# Patient Record
Sex: FEMALE | Race: WHITE | Employment: FULL TIME | URBAN - METROPOLITAN AREA
[De-identification: names, ages, dates, MRNs, and addresses within clinical notes are randomized per-mention and may not be internally consistent; named-entity substitution may affect disease eponyms.]

---

## 2019-08-22 ENCOUNTER — OFFICE VISIT (OUTPATIENT)
Dept: PODIATRY | Age: 49
End: 2019-08-22
Payer: COMMERCIAL

## 2019-08-22 VITALS — BODY MASS INDEX: 22.08 KG/M2 | HEIGHT: 62 IN | WEIGHT: 120 LBS

## 2019-08-22 DIAGNOSIS — M79.605 PAIN IN BOTH LOWER EXTREMITIES: ICD-10-CM

## 2019-08-22 DIAGNOSIS — M79.604 PAIN IN BOTH LOWER EXTREMITIES: ICD-10-CM

## 2019-08-22 DIAGNOSIS — M20.42 HAMMER TOE OF LEFT FOOT: Primary | ICD-10-CM

## 2019-08-22 PROCEDURE — 99203 OFFICE O/P NEW LOW 30 MIN: CPT | Performed by: PODIATRIST

## 2019-08-22 NOTE — PROGRESS NOTES
changes, confusion, headaches or seizures. Negative for weakness and numbness. Dermatological ROS: negative for - mole changes, rash  Cardiovascular: Negative for leg swelling. Gastrointestinal: Negative for constipation, diarrhea, nausea and vomiting. Lower Extremity Physical Examination:   Vitals: There were no vitals filed for this visit. General: AAO x 3 in NAD. Dermatologic Exam:  Skin lesion/ulceration Absent . Skin No rashes or nodules noted. .       Musculoskeletal:     1st MPJ ROM decreased, Bilateral.  Muscle strength 5/5, Bilateral.  Pain present upon palpation of 4th digit, kirstie. Medial longitudinal arch, Bilateral WNL. Ankle ROM WNL,Bilateral.    Dorsally contracted digits absent digits 4 Bilateral.     Vascular: DP and PT pulses palpable 2/4, Bilateral.  CFT <3 seconds, Bilateral.  Hair growth present to the level of the digits, Bilateral.  Edema absent, Bilateral.  Varicosities absent, Bilateral. Erythema absent, Bilateral    Neurological: Sensation intact to light touch to level of digits, Bilateral.  Protective sensation intact 10/10 sites via 5.07/10g Markle-Niko Monofilament, Bilateral.  negative Tinel's, Bilateral.  negative Valleix sign, Bilateral.      Integument: Warm, dry, supple, Bilateral.  Open lesion absent, Bilateral.  Interdigital maceration absent to web spaces 1-4, Bilateral.  Nails are normal in length, thickness and color 1-5 bilateral.  Fissures absent, Bilateral.       Asessment: Patient is a 44 y.o. female with:    Diagnosis Orders   1. Hammer toe of left foot     2. Pain in both lower extremities           Plan: Patient examined and evaluated. Current condition and treatment options discussed in detail. Discussed conservative and surgical options with the patient. Advised pt to consider surgery as last treatment option to straighten hammertoes. She would like to stick with conservative treatment. Dispensed toe sleeves today.   Verbal and written

## 2019-08-29 DIAGNOSIS — B35.1 DERMATOPHYTOSIS OF NAIL: Primary | ICD-10-CM

## 2023-04-18 ENCOUNTER — APPOINTMENT (OUTPATIENT)
Dept: GENERAL RADIOLOGY | Age: 53
End: 2023-04-18
Payer: COMMERCIAL

## 2023-04-18 ENCOUNTER — HOSPITAL ENCOUNTER (EMERGENCY)
Age: 53
Discharge: HOME | End: 2023-04-18
Attending: EMERGENCY MEDICINE
Payer: COMMERCIAL

## 2023-04-18 VITALS
DIASTOLIC BLOOD PRESSURE: 73 MMHG | OXYGEN SATURATION: 100 % | RESPIRATION RATE: 16 BRPM | TEMPERATURE: 98 F | SYSTOLIC BLOOD PRESSURE: 175 MMHG | HEIGHT: 63 IN | WEIGHT: 125 LBS | BODY MASS INDEX: 22.15 KG/M2 | HEART RATE: 50 BPM

## 2023-04-18 DIAGNOSIS — S52.502A CLOSED FRACTURE OF DISTAL END OF LEFT RADIUS, UNSPECIFIED FRACTURE MORPHOLOGY, INITIAL ENCOUNTER: Primary | ICD-10-CM

## 2023-04-18 PROCEDURE — 73110 X-RAY EXAM OF WRIST: CPT

## 2023-04-18 PROCEDURE — 90715 TDAP VACCINE 7 YRS/> IM: CPT | Performed by: NURSE PRACTITIONER

## 2023-04-18 PROCEDURE — 6360000002 HC RX W HCPCS: Performed by: NURSE PRACTITIONER

## 2023-04-18 PROCEDURE — 29125 APPL SHORT ARM SPLINT STATIC: CPT

## 2023-04-18 PROCEDURE — 73130 X-RAY EXAM OF HAND: CPT

## 2023-04-18 PROCEDURE — 99284 EMERGENCY DEPT VISIT MOD MDM: CPT

## 2023-04-18 PROCEDURE — 6370000000 HC RX 637 (ALT 250 FOR IP): Performed by: NURSE PRACTITIONER

## 2023-04-18 PROCEDURE — 90471 IMMUNIZATION ADMIN: CPT | Performed by: NURSE PRACTITIONER

## 2023-04-18 PROCEDURE — 73090 X-RAY EXAM OF FOREARM: CPT

## 2023-04-18 RX ORDER — HYDROCODONE BITARTRATE AND ACETAMINOPHEN 5; 325 MG/1; MG/1
1 TABLET ORAL ONCE
Status: DISCONTINUED | OUTPATIENT
Start: 2023-04-18 | End: 2023-04-18

## 2023-04-18 RX ORDER — HYDROCODONE BITARTRATE AND ACETAMINOPHEN 5; 325 MG/1; MG/1
1 TABLET ORAL ONCE
Status: COMPLETED | OUTPATIENT
Start: 2023-04-18 | End: 2023-04-18

## 2023-04-18 RX ORDER — HYDROCODONE BITARTRATE AND ACETAMINOPHEN 5; 325 MG/1; MG/1
1 TABLET ORAL EVERY 6 HOURS PRN
Qty: 10 TABLET | Refills: 0 | Status: ON HOLD | OUTPATIENT
Start: 2023-04-18 | End: 2023-04-21 | Stop reason: HOSPADM

## 2023-04-18 RX ORDER — MORPHINE SULFATE 2 MG/ML
4 INJECTION, SOLUTION INTRAMUSCULAR; INTRAVENOUS ONCE
Status: DISCONTINUED | OUTPATIENT
Start: 2023-04-18 | End: 2023-04-18

## 2023-04-18 RX ADMIN — HYDROCODONE BITARTRATE AND ACETAMINOPHEN 1 TABLET: 5; 325 TABLET ORAL at 20:51

## 2023-04-18 RX ADMIN — TETANUS TOXOID, REDUCED DIPHTHERIA TOXOID AND ACELLULAR PERTUSSIS VACCINE, ADSORBED 0.5 ML: 5; 2.5; 8; 8; 2.5 SUSPENSION INTRAMUSCULAR at 20:57

## 2023-04-18 ASSESSMENT — ENCOUNTER SYMPTOMS
EYES NEGATIVE: 1
GASTROINTESTINAL NEGATIVE: 1
RESPIRATORY NEGATIVE: 1

## 2023-04-18 ASSESSMENT — PAIN SCALES - GENERAL
PAINLEVEL_OUTOF10: 7
PAINLEVEL_OUTOF10: 7

## 2023-04-18 ASSESSMENT — PAIN DESCRIPTION - ORIENTATION: ORIENTATION: LEFT

## 2023-04-18 ASSESSMENT — PAIN - FUNCTIONAL ASSESSMENT: PAIN_FUNCTIONAL_ASSESSMENT: 0-10

## 2023-04-18 ASSESSMENT — PAIN DESCRIPTION - LOCATION: LOCATION: WRIST

## 2023-04-19 ENCOUNTER — OFFICE VISIT (OUTPATIENT)
Dept: ORTHOPEDIC SURGERY | Age: 53
End: 2023-04-19
Payer: COMMERCIAL

## 2023-04-19 VITALS — RESPIRATION RATE: 14 BRPM | HEIGHT: 63 IN | WEIGHT: 125 LBS | BODY MASS INDEX: 22.15 KG/M2

## 2023-04-19 DIAGNOSIS — S52.552A OTHER CLOSED EXTRA-ARTICULAR FRACTURE OF DISTAL END OF LEFT RADIUS, INITIAL ENCOUNTER: Primary | ICD-10-CM

## 2023-04-19 PROCEDURE — 99204 OFFICE O/P NEW MOD 45 MIN: CPT | Performed by: ORTHOPAEDIC SURGERY

## 2023-04-19 NOTE — ED PROVIDER NOTES
[facility]aur[facility]  Emergency Department  Faculty Attestation     Pt Name: Jennifer Hernandez  MRN: [id number]  [place] 1970  Date of evaluation: 4/19/23    I was personally available for consultation in the Emergency Department. Have reviewed everything on the chart that is available and agree with the documentation provided by the [facility] AND [facility], including discussion about the assessment, treatment plan and disposition. Jennifer Hernandez is a 45 y.o. female who presents with Wrist Injury (Left wrist, running, ankle gave out and fell and believes she broke her wrist,. +deformity)      Vitals:   Vitals:    04/18/23 1841   BP: (!) 175/73   Pulse: 50   Resp: 16   Temp: 98 °F (36.7 °C)   TempSrc: Oral   SpO2: 100%   Weight: 56.7 kg (125 lb)   Height: 5' 3\" (1.6 m)       Impression:   1.  Closed fracture of distal end of left radius, unspecified fracture morphology, initial encounter           Santiago Garcia MD  04/19/23 8099
when to return to the emergency department such as any signs of cyanosis of the finger, significant increase in swelling, significant increase in pain, further injury, or any other new concerning or worsening symptoms. Patient states understanding of education is stable for outpatient follow-up at this time. PLAN (LABS / IMAGING / EKG):  Orders Placed This Encounter   Procedures    XR WRIST LEFT (MIN 3 VIEWS)    XR RADIUS ULNA LEFT (2 VIEWS)    XR HAND LEFT (MIN 3 VIEWS)    [facility] Arm Sling, Left; M       MEDICATIONS ORDERED:  Orders Placed This Encounter   Medications    DISCONTD: HYDROcodone-acetaminophen (NORCO) 5-325 MG per tablet 1 tablet    Tetanus-Diphth-Acell Pertussis (BOOSTRIX) injection 0.5 mL    DISCONTD: morphine (PF) injection 4 mg    HYDROcodone-acetaminophen (NORCO) 5-325 MG per tablet 1 tablet       Controlled Substances Monitoring:     DIAGNOSTIC RESULTS     EKG: All EKG's are interpreted by the Emergency Department Physician who either signs or Co-signs this chart in the absenceof a cardiologist.      RADIOLOGY: All images are read by the radiologist and their interpretations are reviewed. XR WRIST LEFT (MIN 3 VIEWS)   Final Result   1. Acute mildly angulated displaced impaction fracture of the distal left   radial metaphysis. 2. No acute osseous abnormality of the left wrist.   3. No acute osseous abnormality of the left hand. XR RADIUS ULNA LEFT (2 VIEWS)   Final Result   1. Acute mildly angulated displaced impaction fracture of the distal left   radial metaphysis. 2. No acute osseous abnormality of the left wrist.   3. No acute osseous abnormality of the left hand. XR HAND LEFT (MIN 3 VIEWS)   Final Result   1. Acute mildly angulated displaced impaction fracture of the distal left   radial metaphysis. 2. No acute osseous abnormality of the left wrist.   3. No acute osseous abnormality of the left hand.              No results

## 2023-04-19 NOTE — PROGRESS NOTES
Preoperative Instructions:    Stop eating solid foods at midnight the night prior to your surgery. Stop drinking clear liquids at midnight the night prior to your surgery. Arrive at the surgery center (3rd entrance) on __0-61-52_____________ by ____0800am___________. Please stop any blood thinning medications as directed by your surgeon or prescribing physician. Failure to stop certain medications may interfere with your scheduled surgery. These may include: Aspirin, Coumadin, Plavix, NSAIDS (Motrin, Aleve, Advil, Mobic, Celebrex), Eliquis, Pradaxa, Xarelto, Fish oil, and herbal supplements. You may continue the rest of your medications through the night before surgery unless instructed otherwise. Day of surgery please take only the following medication(s) with a small sip of water: Norco if needed for severe pain. Please  shower with antibacterial soap and water (DO NOT REMOVE SPLINT IF instructed not  do so). the day of surgery. Reminders:  -If you are going home the day of your procedure, you will need a family member or friend to stay during the procedure and drive you home after your procedure. Your  must be 25years of age or older and able to sign off on your discharge instructions.    -If you are going home the same day of your surgery, someone must remain with you for the first 24 hours after your surgery if you receive sedation or anesthesia.      -Please do not wear any jewelery, lotions, contacts  or body piercing the day of surgery

## 2023-04-20 ENCOUNTER — ANESTHESIA EVENT (OUTPATIENT)
Dept: OPERATING ROOM | Age: 53
End: 2023-04-20
Payer: COMMERCIAL

## 2023-04-20 RX ORDER — SODIUM CHLORIDE 0.9 % (FLUSH) 0.9 %
5-40 SYRINGE (ML) INJECTION EVERY 12 HOURS SCHEDULED
Status: CANCELLED | OUTPATIENT
Start: 2023-04-20

## 2023-04-20 RX ORDER — ACETAMINOPHEN 325 MG/1
1000 TABLET ORAL ONCE
Status: CANCELLED | OUTPATIENT
Start: 2023-04-20 | End: 2023-04-20

## 2023-04-20 RX ORDER — SODIUM CHLORIDE 0.9 % (FLUSH) 0.9 %
5-40 SYRINGE (ML) INJECTION PRN
Status: CANCELLED | OUTPATIENT
Start: 2023-04-20

## 2023-04-20 RX ORDER — SODIUM CHLORIDE 9 MG/ML
INJECTION, SOLUTION INTRAVENOUS PRN
Status: CANCELLED | OUTPATIENT
Start: 2023-04-20

## 2023-04-21 ENCOUNTER — ANESTHESIA (OUTPATIENT)
Dept: OPERATING ROOM | Age: 53
End: 2023-04-21
Payer: COMMERCIAL

## 2023-04-21 ENCOUNTER — APPOINTMENT (OUTPATIENT)
Dept: GENERAL RADIOLOGY | Age: 53
End: 2023-04-21
Attending: ORTHOPAEDIC SURGERY
Payer: COMMERCIAL

## 2023-04-21 ENCOUNTER — HOSPITAL ENCOUNTER (OUTPATIENT)
Age: 53
Setting detail: OUTPATIENT SURGERY
Discharge: HOME | End: 2023-04-21
Attending: ORTHOPAEDIC SURGERY | Admitting: ORTHOPAEDIC SURGERY
Payer: COMMERCIAL

## 2023-04-21 VITALS
HEIGHT: 63 IN | OXYGEN SATURATION: 98 % | HEART RATE: 49 BPM | SYSTOLIC BLOOD PRESSURE: 133 MMHG | BODY MASS INDEX: 22.71 KG/M2 | RESPIRATION RATE: 16 BRPM | TEMPERATURE: 97.3 F | WEIGHT: 128.2 LBS | DIASTOLIC BLOOD PRESSURE: 71 MMHG

## 2023-04-21 DIAGNOSIS — S52.552A OTHER CLOSED EXTRA-ARTICULAR FRACTURE OF DISTAL END OF LEFT RADIUS, INITIAL ENCOUNTER: Primary | ICD-10-CM

## 2023-04-21 PROCEDURE — 7100000010 HC PHASE II RECOVERY - FIRST 15 MIN: Performed by: ORTHOPAEDIC SURGERY

## 2023-04-21 PROCEDURE — 25605 CLTX DST RDL FX/EPHYS SEP W/: CPT | Performed by: ORTHOPAEDIC SURGERY

## 2023-04-21 PROCEDURE — 7100000011 HC PHASE II RECOVERY - ADDTL 15 MIN: Performed by: ORTHOPAEDIC SURGERY

## 2023-04-21 PROCEDURE — 3600000002 HC SURGERY LEVEL 2 BASE: Performed by: ORTHOPAEDIC SURGERY

## 2023-04-21 PROCEDURE — 6370000000 HC RX 637 (ALT 250 FOR IP)

## 2023-04-21 PROCEDURE — 3700000001 HC ADD 15 MINUTES (ANESTHESIA): Performed by: ORTHOPAEDIC SURGERY

## 2023-04-21 PROCEDURE — 2500000003 HC RX 250 WO HCPCS

## 2023-04-21 PROCEDURE — 7100000000 HC PACU RECOVERY - FIRST 15 MIN: Performed by: ORTHOPAEDIC SURGERY

## 2023-04-21 PROCEDURE — 3209999900 FLUORO FOR SURGICAL PROCEDURES

## 2023-04-21 PROCEDURE — 2580000003 HC RX 258: Performed by: ANESTHESIOLOGY

## 2023-04-21 PROCEDURE — 3700000000 HC ANESTHESIA ATTENDED CARE: Performed by: ORTHOPAEDIC SURGERY

## 2023-04-21 PROCEDURE — 2580000003 HC RX 258: Performed by: ORTHOPAEDIC SURGERY

## 2023-04-21 PROCEDURE — 7100000001 HC PACU RECOVERY - ADDTL 15 MIN: Performed by: ORTHOPAEDIC SURGERY

## 2023-04-21 PROCEDURE — 2709999900 HC NON-CHARGEABLE SUPPLY: Performed by: ORTHOPAEDIC SURGERY

## 2023-04-21 PROCEDURE — 3600000012 HC SURGERY LEVEL 2 ADDTL 15MIN: Performed by: ORTHOPAEDIC SURGERY

## 2023-04-21 PROCEDURE — 6360000002 HC RX W HCPCS: Performed by: ORTHOPAEDIC SURGERY

## 2023-04-21 PROCEDURE — 6360000002 HC RX W HCPCS

## 2023-04-21 RX ORDER — DEXAMETHASONE SODIUM PHOSPHATE 10 MG/ML
INJECTION, SOLUTION INTRAMUSCULAR; INTRAVENOUS PRN
Status: DISCONTINUED | OUTPATIENT
Start: 2023-04-21 | End: 2023-04-21 | Stop reason: SDUPTHER

## 2023-04-21 RX ORDER — LIDOCAINE HYDROCHLORIDE 10 MG/ML
1 INJECTION, SOLUTION INFILTRATION; PERINEURAL
Status: DISCONTINUED | OUTPATIENT
Start: 2023-04-21 | End: 2023-04-21 | Stop reason: HOSPADM

## 2023-04-21 RX ORDER — PROPOFOL 10 MG/ML
INJECTION, EMULSION INTRAVENOUS PRN
Status: DISCONTINUED | OUTPATIENT
Start: 2023-04-21 | End: 2023-04-21 | Stop reason: SDUPTHER

## 2023-04-21 RX ORDER — OXYCODONE HYDROCHLORIDE 5 MG/1
5 TABLET ORAL PRN
Status: DISCONTINUED | OUTPATIENT
Start: 2023-04-21 | End: 2023-04-21 | Stop reason: HOSPADM

## 2023-04-21 RX ORDER — SODIUM CHLORIDE, SODIUM LACTATE, POTASSIUM CHLORIDE, CALCIUM CHLORIDE 600; 310; 30; 20 MG/100ML; MG/100ML; MG/100ML; MG/100ML
INJECTION, SOLUTION INTRAVENOUS CONTINUOUS
Status: DISCONTINUED | OUTPATIENT
Start: 2023-04-21 | End: 2023-04-21 | Stop reason: HOSPADM

## 2023-04-21 RX ORDER — DEXAMETHASONE SODIUM PHOSPHATE 10 MG/ML
10 INJECTION, SOLUTION INTRAMUSCULAR; INTRAVENOUS ONCE
Status: DISCONTINUED | OUTPATIENT
Start: 2023-04-21 | End: 2023-04-21 | Stop reason: HOSPADM

## 2023-04-21 RX ORDER — CEFAZOLIN 2 G/1
INJECTION, POWDER, FOR SOLUTION INTRAMUSCULAR; INTRAVENOUS
Status: DISCONTINUED
Start: 2023-04-21 | End: 2023-04-21 | Stop reason: HOSPADM

## 2023-04-21 RX ORDER — SODIUM CHLORIDE 9 MG/ML
INJECTION, SOLUTION INTRAVENOUS PRN
Status: DISCONTINUED | OUTPATIENT
Start: 2023-04-21 | End: 2023-04-21 | Stop reason: HOSPADM

## 2023-04-21 RX ORDER — ONDANSETRON 2 MG/ML
4 INJECTION INTRAMUSCULAR; INTRAVENOUS
Status: DISCONTINUED | OUTPATIENT
Start: 2023-04-21 | End: 2023-04-21 | Stop reason: HOSPADM

## 2023-04-21 RX ORDER — SODIUM CHLORIDE 0.9 % (FLUSH) 0.9 %
5-40 SYRINGE (ML) INJECTION EVERY 12 HOURS SCHEDULED
Status: DISCONTINUED | OUTPATIENT
Start: 2023-04-21 | End: 2023-04-21 | Stop reason: HOSPADM

## 2023-04-21 RX ORDER — ACETAMINOPHEN 500 MG
1000 TABLET ORAL ONCE
Status: COMPLETED | OUTPATIENT
Start: 2023-04-21 | End: 2023-04-21

## 2023-04-21 RX ORDER — MEPERIDINE HYDROCHLORIDE 50 MG/ML
12.5 INJECTION INTRAMUSCULAR; INTRAVENOUS; SUBCUTANEOUS ONCE
Status: DISCONTINUED | OUTPATIENT
Start: 2023-04-21 | End: 2023-04-21 | Stop reason: HOSPADM

## 2023-04-21 RX ORDER — ACETAMINOPHEN 500 MG
TABLET ORAL
Status: COMPLETED
Start: 2023-04-21 | End: 2023-04-21

## 2023-04-21 RX ORDER — HYDRALAZINE HYDROCHLORIDE 20 MG/ML
10 INJECTION INTRAMUSCULAR; INTRAVENOUS
Status: DISCONTINUED | OUTPATIENT
Start: 2023-04-21 | End: 2023-04-21 | Stop reason: HOSPADM

## 2023-04-21 RX ORDER — SODIUM CHLORIDE 0.9 % (FLUSH) 0.9 %
5-40 SYRINGE (ML) INJECTION PRN
Status: DISCONTINUED | OUTPATIENT
Start: 2023-04-21 | End: 2023-04-21 | Stop reason: HOSPADM

## 2023-04-21 RX ORDER — METOCLOPRAMIDE HYDROCHLORIDE 5 MG/ML
10 INJECTION INTRAMUSCULAR; INTRAVENOUS
Status: DISCONTINUED | OUTPATIENT
Start: 2023-04-21 | End: 2023-04-21 | Stop reason: HOSPADM

## 2023-04-21 RX ORDER — DIPHENHYDRAMINE HYDROCHLORIDE 50 MG/ML
12.5 INJECTION INTRAMUSCULAR; INTRAVENOUS
Status: DISCONTINUED | OUTPATIENT
Start: 2023-04-21 | End: 2023-04-21 | Stop reason: HOSPADM

## 2023-04-21 RX ORDER — LIDOCAINE HYDROCHLORIDE 10 MG/ML
INJECTION, SOLUTION INFILTRATION; PERINEURAL PRN
Status: DISCONTINUED | OUTPATIENT
Start: 2023-04-21 | End: 2023-04-21 | Stop reason: SDUPTHER

## 2023-04-21 RX ORDER — MORPHINE SULFATE 2 MG/ML
1 INJECTION, SOLUTION INTRAMUSCULAR; INTRAVENOUS EVERY 5 MIN PRN
Status: DISCONTINUED | OUTPATIENT
Start: 2023-04-21 | End: 2023-04-21 | Stop reason: HOSPADM

## 2023-04-21 RX ORDER — HYDROCODONE BITARTRATE AND ACETAMINOPHEN 5; 325 MG/1; MG/1
1 TABLET ORAL EVERY 4 HOURS PRN
Qty: 20 TABLET | Refills: 0 | Status: SHIPPED | OUTPATIENT
Start: 2023-04-21 | End: 2023-04-26

## 2023-04-21 RX ORDER — DEXAMETHASONE SODIUM PHOSPHATE 10 MG/ML
INJECTION, SOLUTION INTRAMUSCULAR; INTRAVENOUS
Status: COMPLETED
Start: 2023-04-21 | End: 2023-04-21

## 2023-04-21 RX ORDER — FENTANYL CITRATE 50 UG/ML
INJECTION, SOLUTION INTRAMUSCULAR; INTRAVENOUS PRN
Status: DISCONTINUED | OUTPATIENT
Start: 2023-04-21 | End: 2023-04-21 | Stop reason: SDUPTHER

## 2023-04-21 RX ORDER — OXYCODONE HYDROCHLORIDE 5 MG/1
10 TABLET ORAL PRN
Status: DISCONTINUED | OUTPATIENT
Start: 2023-04-21 | End: 2023-04-21 | Stop reason: HOSPADM

## 2023-04-21 RX ORDER — SODIUM CHLORIDE 9 MG/ML
25 INJECTION, SOLUTION INTRAVENOUS PRN
Status: DISCONTINUED | OUTPATIENT
Start: 2023-04-21 | End: 2023-04-21 | Stop reason: HOSPADM

## 2023-04-21 RX ORDER — MIDAZOLAM HYDROCHLORIDE 1 MG/ML
INJECTION INTRAMUSCULAR; INTRAVENOUS PRN
Status: DISCONTINUED | OUTPATIENT
Start: 2023-04-21 | End: 2023-04-21 | Stop reason: SDUPTHER

## 2023-04-21 RX ORDER — ONDANSETRON 2 MG/ML
INJECTION INTRAMUSCULAR; INTRAVENOUS PRN
Status: DISCONTINUED | OUTPATIENT
Start: 2023-04-21 | End: 2023-04-21 | Stop reason: SDUPTHER

## 2023-04-21 RX ADMIN — Medication 1000 MG: at 08:39

## 2023-04-21 RX ADMIN — SODIUM CHLORIDE, POTASSIUM CHLORIDE, SODIUM LACTATE AND CALCIUM CHLORIDE: 600; 310; 30; 20 INJECTION, SOLUTION INTRAVENOUS at 09:03

## 2023-04-21 RX ADMIN — LIDOCAINE HYDROCHLORIDE 40 MG: 10 INJECTION, SOLUTION INFILTRATION; PERINEURAL at 09:58

## 2023-04-21 RX ADMIN — PROPOFOL 150 MG: 10 INJECTION, EMULSION INTRAVENOUS at 09:58

## 2023-04-21 RX ADMIN — MIDAZOLAM 2 MG: 1 INJECTION INTRAMUSCULAR; INTRAVENOUS at 09:54

## 2023-04-21 RX ADMIN — ACETAMINOPHEN 1000 MG: 500 TABLET ORAL at 08:39

## 2023-04-21 RX ADMIN — DEXAMETHASONE SODIUM PHOSPHATE 10 MG: 10 INJECTION, SOLUTION INTRAMUSCULAR; INTRAVENOUS at 10:08

## 2023-04-21 RX ADMIN — ONDANSETRON 4 MG: 2 INJECTION INTRAMUSCULAR; INTRAVENOUS at 10:13

## 2023-04-21 RX ADMIN — FENTANYL CITRATE 50 MCG: 50 INJECTION, SOLUTION INTRAMUSCULAR; INTRAVENOUS at 09:58

## 2023-04-21 RX ADMIN — CEFAZOLIN 2000 MG: 2 INJECTION, POWDER, FOR SOLUTION INTRAMUSCULAR; INTRAVENOUS at 10:08

## 2023-04-21 ASSESSMENT — PAIN - FUNCTIONAL ASSESSMENT: PAIN_FUNCTIONAL_ASSESSMENT: 0-10

## 2023-04-21 ASSESSMENT — PAIN DESCRIPTION - DESCRIPTORS: DESCRIPTORS: ACHING

## 2023-04-21 NOTE — BRIEF OP NOTE
Brief Postoperative Note      Patient: Claude Seaman  YOB: 1970  MRN: [id number]    Date of Procedure: 4/21/2023    Pre-Op Diagnosis Codes:     * Closed fracture of distal end of left radius, unspecified fracture morphology, initial encounter [S52.502A]    Post-Op Diagnosis: Same       Procedure(s):  LEFT DISTAL RADIUS CLOSED REDUCTION AND IMMOBILIZATION    Surgeon(s):  Debo Brown MD    Assistant:  Resident: Keyonna Ugaret DO    Anesthesia: General    Estimated Blood Loss (mL): None    Complications: None    Specimens:   * No specimens in log *    Implants:  * No implants in log *      Drains: * No LDAs found *    Findings: See procedure report      Electronically signed by Debo Brown MD on 4/21/2023 at 10:17 AM

## 2023-04-21 NOTE — PROGRESS NOTES
CLINICAL PHARMACY NOTE: MEDS TO BEDS    Total # of Prescriptions Filled: 1   The following medications were delivered to the patient:  Larence Model 5-325    Additional Documentation:

## 2023-04-21 NOTE — DISCHARGE INSTRUCTIONS
after that for pain and swelling. EXERCISE  You may begin moving your fingers which will help decrease swelling    EMERGENCIES  Contact Dr. Juma Fairchild or his nurse at [phone] if any of the following are present: Painful swelling or numbness, Unrelenting pain, Fever (over 101 - it is normal to have a low grade fever for the first day or two following surgery) or chills, Redness around incisions, Color change in hand or fingers, Difficulty breathing, Excessive nausea/vomiting **If you have an emergency after office hours or on the weekend, contact the same office number ([phone]) and you will be connected to our page service - they will contact Dr. Juma Fairchild or his partner if he is unavailable. **If you have an emergency that requires immediate attention, proceed to the nearest emergency room. FOLLOW-UP CARE / QUESTIONS  If you have any questions/concerns, please call the office [phone]. You should be scheduled to follow up with Dr. Juma Fairchild in 2 weeks. Contact the office to confirm/schedule your post-procedure visit. Activity  You have had anesthesia today  Do not drive, operate heavy equipment, consume alcoholic beverages, or make any important decisions  for 24 hours   If you are taking pain medication: Do not drive or consume alcohol. Take your time changing positions today. You may feel light headed or dizzy if you move too quickly. Continue your home medications as ordered by your physician. Diet   You can eat your normal diet when you feel well. You should start off with bland foods like chicken soup, toast, or yogurt. Then advance as tolerated. Drink plenty of fluids (unless your doctor tells you not to). Your urine should be very lightly colored without a strong odor.

## 2023-04-21 NOTE — H&P
PHYSICAL EXAM:  Blood pressure 126/76, pulse 53, temperature 97.3 °F (36.3 °C), temperature source Oral, resp. rate 13, height 5' 3\" (1.6 m), weight 128 lb 3.2 oz (58.2 kg), SpO2 97 %. Gen: alert and oriented, NAD, cooperative  Head: normocephalic atraumatic   Cardiovascular: Regular rate, no dependent edema, distal pulses 2+  Respiratory: Chest symmetric, no accessory muscle use, normal respirations  MSK:   LUE   Splint on, clean/dry/intact. Fingers swollen but compressible. No pain with passive flexion/extension of fingers. Sensation and motor intact grossly. Fingers warm and well perfused with bcr. A/P: 45 y.o. female  was evaluated and after discussion surgical and non surgical options, the patient has decided to undergo closed reduction of left distal radius  Consent obtained and in chart. All questions answered appropriately. Surgical risks including but not limited to: bleeding, blood clots, infection, damage to surrounding tissues/nerves/vessels, failure of fixation, failure of wounds to heal, loss of motion, stiffness, dislocation, postoperative pain, recurrence of symptoms, need for future surgery, risks of anesthesia, loss of limb and loss of life were all discussed with the patient. Knowing these risks, the patient wishes to proceed with surgery. -Abx OCTOR  -Site marked, Consent in chart  -AC held  -NPO since midnight  -All question answered. ________________________________  Niharika Waller D.O.   Orthopedic Surgery Resident, PGY-1  [address] [facility]

## 2023-04-21 NOTE — ANESTHESIA POSTPROCEDURE EVALUATION
POST- ANESTHESIA EVALUATION       Pt Name: Jonah Romero  MRN: [id number]  [place]: 1970  Date of evaluation: 4/21/2023  Time:  11:57 AM      /71   Pulse (!) 49   Temp 97.3 °F (36.3 °C) (Temporal)   Resp 16   Ht 5' 3\" (1.6 m)   Wt 128 lb 3.2 oz (58.2 kg)   LMP  (Approximate) Comment: last period iin 2017  SpO2 98%   BMI 22.71 kg/m²      Consciousness Level  Awake  Cardiopulmonary Status  Stable  Pain Adequately Treated YES  Nausea / Vomiting  NO  Adequate Hydration  YES  Anesthesia Related Complications NONE      Electronically signed by Daphne Martin MD on 4/21/2023 at 11:57 AM       Department of Anesthesiology  Postprocedure Note    Patient: Jonah Romero  MRN: [id number]  [place]: 1970  Date of evaluation: 4/21/2023      Procedure Summary     Date: 04/21/23 Room / Location: [address]) [facility]    Anesthesia Start: 7958 Anesthesia Stop: 1033    Procedure: LEFT DISTAL RADIUS CLOSED REDUCTION AND IMMOBILIZATION Diagnosis:       Closed fracture of distal end of left radius, unspecified fracture morphology, initial encounter      (Closed fracture of distal end of left radius, unspecified fracture morphology, initial encounter [S52.502A])    Surgeons: Rimma Mccarty MD Responsible Provider: Daphne Martin MD    Anesthesia Type: general ASA Status: 1          Anesthesia Type: No value filed.     Alejandra Phase I: Alejandra Score: 10    Alejandra Phase II: Alejandra Score: 10      Anesthesia Post Evaluation

## 2023-04-21 NOTE — ANESTHESIA PRE PROCEDURE
Applicable):  No results found for: LABABO, LABRH    Drug/Infectious Status (If Applicable):  No results found for: HIV, HEPCAB    COVID-19 Screening (If Applicable): No results found for: COVID19        Anesthesia Evaluation  Patient summary reviewed and Nursing notes reviewed no history of anesthetic complications:   Airway: Mallampati: I  TM distance: >3 FB   Neck ROM: full  Mouth opening: > = 3 FB   Dental: normal exam         Pulmonary:Negative Pulmonary ROS and normal exam  breath sounds clear to auscultation                             Cardiovascular:Negative CV ROS            Rhythm: regular  Rate: normal                    Neuro/Psych:   Negative Neuro/Psych ROS              GI/Hepatic/Renal: Neg GI/Hepatic/Renal ROS            Endo/Other: Negative Endo/Other ROS                     ROS comment: Closed fracture of distal end of left radius Abdominal:       Abdomen: soft. Vascular: negative vascular ROS. Other Findings:           Anesthesia Plan      general     ASA 1       Induction: intravenous. Anesthetic plan and risks discussed with patient. Plan discussed with CRNA.                     Neli Truong MD   4/21/2023

## 2023-04-21 NOTE — PROGRESS NOTES
ORTHOPEDIC PATIENT EVALUATION      HPI / Chief Complaint  Jil Aparicio is a 45 y.o. female who presents for evaluation of her left wrist.  On 2023 she indicates that she went out for a run and her ankle gave out on her and she fell landing on and hurting her left wrist.  She was seen in the emergency department where she was diagnosed with a distal radius fracture. She was placed in a sling and sugar-tong splint and referred to my clinic for further evaluation and treatment. Her pain at this time is localized to the wrist.  She denies having any associated numbness or tingling. Past Medical History  Doris Jani  has no past medical history on file. Past Surgical History  Doris Jain  has a past surgical history that includes  section and Tonsillectomy. Current Medications  Current Outpatient Medications   Medication Sig Dispense Refill    Calcium Carbonate (CALCIUM 500 PO) Take by mouth daily      HYDROcodone-acetaminophen (NORCO) 5-325 MG per tablet Take 1 tablet by mouth every 6 hours as needed for Pain for up to 3 days. Intended supply: 3 days. Take lowest dose possible to manage pain Max Daily Amount: 4 tablets 10 tablet 0     No current facility-administered medications for this visit. Allergies  Allergies have been reviewed. Susan has No Known Allergies. Social History  Susan  reports that she has never smoked. She has never used smokeless tobacco. She reports that she does not currently use alcohol. She reports that she does not use drugs. Family History  Susan's family history is not on file. Review of Systems   History obtained from the patient.    REVIEW OF SYSTEMS:   Constitution: negative for fever, chills, weight loss or malaise   Musculoskeletal: As noted in the HPI   Neurologic: As noted in the HPI    Physical Exam  Resp 14   Ht 5' 3\" (1.6 m)   Wt 125 lb (56.7 kg)   BMI 22.14 kg/m²    General Appearance: alert, well appearing, and in no distress  Mental Status:

## 2023-04-24 NOTE — OP NOTE
Procedure Report     Date of Procedure: 4/21/23     Pre-procedure Diagnosis: Closed left distal radius fracture (S52.502A)     Post-procedure Diagnosis: Closed left distal radius fracture (B85.011X)     Procedure: Closed reduction and immobilization of left distal radius fracture (76417)     Surgeon(s): Sharon Ghosh MD    Assistant: Nicolasa Carrion DO (PGY 1)     Anesthesia: General     Estimated blood loss: None     Findings: Angulated distal radius fracture     Procedure Indications: Ms. Zoila Schumacher is a 45 y.o. old female who presented with a closed left distal radius fracture. Following a discussion with the patient regarding both non-operative and operative treatment options, she consented to proceed with closed reduction and immobilization of her left distal radius fracture. she came to this decision after demonstrating an understanding of our discussion regarding details of the procedure, risks and benefits, expected outcome, and postoperative course. Procedure technique: Following appropriate identification of the patient and her affected extremity, consent was reviewed with the patient and her affected extremity was signed. She was wheeled to the OR where she was sedated by the anesthesia service and her airway was secured. I then proceeded to gently manipulate the patient's left distal radius under fluoroscopic guidance reducing the distal radius to acceptable alignment. A well-padded sugar tong splint was then applied to the patient's left wrist and upper extremity. A 3-point mold was applied while the plastic cured. Maintained alignment of the distal radius was confirmed using fluoroscopy. her left upper extremity was then placed in a sling. She was transferred to her bed and wheeled to recovery in stable condition.       Complications: None     Post-procedure Condition: Stable

## 2023-05-05 ENCOUNTER — OFFICE VISIT (OUTPATIENT)
Dept: ORTHOPEDIC SURGERY | Age: 53
End: 2023-05-05

## 2023-05-05 VITALS — RESPIRATION RATE: 14 BRPM | HEIGHT: 63 IN | BODY MASS INDEX: 22.68 KG/M2 | WEIGHT: 128 LBS

## 2023-05-05 DIAGNOSIS — S52.552A OTHER CLOSED EXTRA-ARTICULAR FRACTURE OF DISTAL END OF LEFT RADIUS, INITIAL ENCOUNTER: Primary | ICD-10-CM

## 2023-05-08 ENCOUNTER — TELEPHONE (OUTPATIENT)
Dept: ORTHOPEDIC SURGERY | Age: 53
End: 2023-05-08

## 2023-05-08 NOTE — TELEPHONE ENCOUNTER
Patient with Left distal Radius Fx was seen on Friday, 5/5 and feels like her cast may be too long for her arm. She is asking for a return call as soon as possible to discuss. Thank you.

## 2023-05-08 NOTE — TELEPHONE ENCOUNTER
Per  rashaun patient can come into the office to get a new cast put on. Patient stated she will come to the [place] office to have new one placed.

## 2023-05-24 ENCOUNTER — OFFICE VISIT (OUTPATIENT)
Dept: ORTHOPEDIC SURGERY | Age: 53
End: 2023-05-24

## 2023-05-24 VITALS — BODY MASS INDEX: 22.68 KG/M2 | HEIGHT: 63 IN | RESPIRATION RATE: 14 BRPM | WEIGHT: 128 LBS

## 2023-05-24 DIAGNOSIS — S52.552A OTHER CLOSED EXTRA-ARTICULAR FRACTURE OF DISTAL END OF LEFT RADIUS, INITIAL ENCOUNTER: Primary | ICD-10-CM

## 2023-05-24 PROCEDURE — 99024 POSTOP FOLLOW-UP VISIT: CPT | Performed by: ORTHOPAEDIC SURGERY

## 2023-05-29 NOTE — PROGRESS NOTES
Procedure: Left distal radius fracture closed reduction and immobilization  Date of procedure: 4/21/2023    HPI: Ms. Isaiah Pike is a 75-year-old approximately one month status post the aforementioned procedure. She indicates that she is doing fairly well at this time having minimal pain. Physical examination:  Evaluation patient's left wrist and upper extremity demonstrates intact skin without warmth or erythema but she has some mild swelling and ecchymosis present. She has a 2+ radial pulse with brisk cap refill in her fingers. She can actively flex, extend, abduct and adduct all of her fingers. Imaging studies:  3 x-ray views of the left wrist completed on 5/24/2023 reviewed independently demonstrating a distal radius fracture in acceptable alignment without any evidence of dislocation or subluxation. She does have some interval callus formation noted across the fracture site. Impression and plan: Ms. Isaiah Pike is a 75-year-old approximately 4 weeks out from a closed left distal radius fracture status post closed reduction and immobilization. She is doing reasonably well at this time. She has her daughters wedding coming up this weekend and so as previously discussed she presents today to have her fracture brace taken off which it was. She was placed in a removable wrist brace. She is to keep this on is much as possible. She states that she intends to wear it during the wedding but will take it off only for pictures. In 2 weeks time she is to begin therapy. A prescription was provided to facilitate this. At that point once her pain has subsided completely she can gradually wean out of the brace that she feels comfortable. I will see her back for reevaluation in 2 months but she was encouraged to return or call earlier with questions and or concerns.

## 2023-06-18 SDOH — HEALTH STABILITY: PHYSICAL HEALTH: ON AVERAGE, HOW MANY DAYS PER WEEK DO YOU ENGAGE IN MODERATE TO STRENUOUS EXERCISE (LIKE A BRISK WALK)?: 4 DAYS

## 2023-06-18 SDOH — HEALTH STABILITY: PHYSICAL HEALTH: ON AVERAGE, HOW MANY MINUTES DO YOU ENGAGE IN EXERCISE AT THIS LEVEL?: 50 MIN

## 2023-06-21 ENCOUNTER — HOSPITAL ENCOUNTER (OUTPATIENT)
Dept: OCCUPATIONAL THERAPY | Facility: CLINIC | Age: 53
Setting detail: THERAPIES SERIES
Discharge: HOME | End: 2023-06-21
Attending: ORTHOPAEDIC SURGERY
Payer: COMMERCIAL

## 2023-06-21 PROCEDURE — 97140 MANUAL THERAPY 1/> REGIONS: CPT

## 2023-06-21 PROCEDURE — 97110 THERAPEUTIC EXERCISES: CPT

## 2023-06-21 NOTE — FLOWSHEET NOTE
[] Jayesh Mark       Occupational Therapy            1st floor       [address], [place]         Phone: [phone]       Fax: [phone] [x] [facility]  [address], [place]  Phone: [phone]  Fax: [phone]     Occupational Therapy Daily Treatment Note    Date:  2023  Patient Name:  Elise Michele    :  1970  MRN: [id number]  Referring Provider:  Gael Mckenna MD  Insurance: [facility] -  visits remaining, combined PT/OT/SP    HARD MAX  Medical Diagnosis: S52.552A (ICD-10-CM) - Other closed extra-articular fracture of distal end of left radius, initial encounter         Rehab Codes: pain in wrist M25.53,, muscle wasting of forearm M62.53,, or muscle wasting of hand M62.54,   Onset Date: 23                 Next Dr. Carbajalls: 23  Visit# / total visits: ; Progress note for Medicare patient due at visit 9    Cancels/No Shows: 0/0      Subjective:    Pain:  [] Yes  [x] No Location:  N/A Pain Rating: (0-10 scale) 0/10  Pain altered Tx:  [x] No  [] Yes  Action:  Pt Comments: It is feeling good. Pt reports having soreness in L wrist after completing HEP. Objective:  Modalities:  Precautions:  Exercises:  Exercise Reps/Time Weight/Level Comments Completed   Wrist AROM 3x10 AROM Flexion/extension  Radial/ulnar deviation  X   Wrist extension 3x10 yellow     Wrist flexion 3x10 yellow     Wrist stretching [id number]   Flexion/extension     putty 2-3 mins yellow gripping     Power web 3x10 Green   X   Gripping 3x10 35lbs  X   Pronation/supination 3x10 2lbs  X   Flexbar  Wrist Flexion/Ext  Wrist pronation  Wrist supination  Wrist ulnar deviation  Wrist radial dev 3x10 Red  X   Pronated wrist curl 3x10 500g Small green ball X   Wrist roll 5 1lbs  X   Other: Access Code: [email or web address]  URL: [email or web address]  Date: 2023  Prepared by: Marlin Apgar      Treatment Charges: Mins Units   []  Modalities:

## 2023-06-26 ENCOUNTER — HOSPITAL ENCOUNTER (OUTPATIENT)
Dept: OCCUPATIONAL THERAPY | Facility: CLINIC | Age: 53
Setting detail: THERAPIES SERIES
Discharge: HOME | End: 2023-06-26
Attending: ORTHOPAEDIC SURGERY
Payer: COMMERCIAL

## 2023-06-30 DIAGNOSIS — M25.571 RIGHT ANKLE PAIN, UNSPECIFIED CHRONICITY: Primary | ICD-10-CM

## 2023-07-02 SDOH — HEALTH STABILITY: PHYSICAL HEALTH: ON AVERAGE, HOW MANY MINUTES DO YOU ENGAGE IN EXERCISE AT THIS LEVEL?: 50 MIN

## 2023-07-02 SDOH — HEALTH STABILITY: PHYSICAL HEALTH: ON AVERAGE, HOW MANY DAYS PER WEEK DO YOU ENGAGE IN MODERATE TO STRENUOUS EXERCISE (LIKE A BRISK WALK)?: 4 DAYS

## 2023-07-02 ASSESSMENT — SOCIAL DETERMINANTS OF HEALTH (SDOH)
WITHIN THE LAST YEAR, HAVE TO BEEN RAPED OR FORCED TO HAVE ANY KIND OF SEXUAL ACTIVITY BY YOUR PARTNER OR EX-PARTNER?: NO
WITHIN THE LAST YEAR, HAVE YOU BEEN HUMILIATED OR EMOTIONALLY ABUSED IN OTHER WAYS BY YOUR PARTNER OR EX-PARTNER?: NO
WITHIN THE LAST YEAR, HAVE YOU BEEN KICKED, HIT, SLAPPED, OR OTHERWISE PHYSICALLY HURT BY YOUR PARTNER OR EX-PARTNER?: NO
WITHIN THE LAST YEAR, HAVE YOU BEEN AFRAID OF YOUR PARTNER OR EX-PARTNER?: NO

## 2023-07-03 ENCOUNTER — OFFICE VISIT (OUTPATIENT)
Dept: ORTHOPEDIC SURGERY | Age: 53
End: 2023-07-03
Payer: COMMERCIAL

## 2023-07-03 VITALS — BODY MASS INDEX: 23.04 KG/M2 | RESPIRATION RATE: 14 BRPM | HEIGHT: 63 IN | OXYGEN SATURATION: 100 % | WEIGHT: 130 LBS

## 2023-07-03 DIAGNOSIS — M25.371 INSTABILITY OF RIGHT ANKLE JOINT: Primary | ICD-10-CM

## 2023-07-03 DIAGNOSIS — M95.8 OSTEOCHONDRAL DEFECT OF ANKLE: ICD-10-CM

## 2023-07-03 PROCEDURE — 99203 OFFICE O/P NEW LOW 30 MIN: CPT | Performed by: ORTHOPAEDIC SURGERY

## 2023-07-03 NOTE — PROGRESS NOTES
[address] [facility] ORTHOPEDICS AND SPORTS MEDICINE  [address] [facility]  [address] [facility],[address]  Dept: [phone]    Ambulatory Orthopedic Consult      CHIEF COMPLAINT:    Chief Complaint   Patient presents with    Ankle Pain     Right       HISTORY OF PRESENT ILLNESS:      The patient is a 45 y.o. female who is being seen for evaluation of the above, which began around 2019 atraumatically . At today's visit, she is using no brace/assistive device. History is obtained today from:   [x]  the patient     [x]  EMR     []  one family member/friend    []  multiple family members/friends    []  other: At today's visit, she localizes her pain to the right lateral/anterolateral ankle vaguely, but reports that her main complaint is not pain, but that her ankle \"gives out\", and reports that this is happened multiple times. She reports that her most recent episode was on 2023 when her ankle gave out resulting in a fall during which she sustained a distal radius fracture. She reports that the most recent episode before this was approximately 8 months prior. She reports that there might be an episode of pain that goes along with and/or precedes these episodes of giving out, but she is unable to clarify this definitively. She denies any clicking/catching in the ankle. REVIEW OF SYSTEMS:  Musculoskeletal: See HPI for pertinent positives     Past Medical History:    She  has no past medical history on file. Past Surgical History:    She  has a past surgical history that includes  section; Tonsillectomy; Wrist fracture surgery (Left, 2023); and Wrist Closed Reduction (N/A, 2023). Current Medications:     Current Outpatient Medications:     Calcium Carbonate (CALCIUM 500 PO), Take by mouth daily, Disp: , Rfl:      Allergies:    Patient has no known allergies.     Family History:  family history is not on

## 2023-10-16 ENCOUNTER — HOSPITAL ENCOUNTER (OUTPATIENT)
Dept: PHYSICAL THERAPY | Facility: CLINIC | Age: 53
Setting detail: THERAPIES SERIES
Discharge: HOME | End: 2023-10-16
Payer: COMMERCIAL

## 2023-10-16 DIAGNOSIS — M25.371 INSTABILITY OF RIGHT ANKLE JOINT: Primary | ICD-10-CM

## 2023-10-16 PROCEDURE — 97016 VASOPNEUMATIC DEVICE THERAPY: CPT

## 2023-10-16 PROCEDURE — 97161 PT EVAL LOW COMPLEX 20 MIN: CPT

## 2023-10-16 PROCEDURE — 97140 MANUAL THERAPY 1/> REGIONS: CPT

## 2023-10-16 NOTE — CONSULTS
[] [address] [place] [address]   Outpatient Rehabilitation &  Therapy  [facility]  P: [phone]  F: [phone] [] [address] [facility]  P: [phone]  F: [phone]        Physical Therapy Running Evaluation    Date:  10/16/2023  Patient: Duane Parr   : 1970  MRN: [id number]  Physician: Dr. Christopher Cano: Jessica Single; Jorden yr; 110/110 remaining vs; no auth req; hard max; not comb; 20% coins; ded met; 3000/832.50 remaining OOP  Medical Diagnosis:  R ankle stability  Rehab Codes: M25.571, M25.5671, R26.89  Onset date: 7/3/23    Next Dr's appt.:none    Subjective:   CC: R medial ankle pain  HPI: pain started back in 2023. Saw Dr. Deniz Cortés in July who ordered PT, but she did not attend. She saw Mukund Torres on the track and gave her a few exercieses. She raced [place] in 4:22 and ran with no pain. Took last week off. .  Runs in [address]. Goal is [place] full and half she has noticed a small bump on medial lower leg, but has not become any more sensitive since then. Wants to get back up to 20 mile long run; 4 days/wk 35-40. Past HEP: burrito, toe yoga, foot doming     PMHx: [] Unremarkable [] Diabetes [] HTN  [] Pacemaker   [] MI/Heart Problems [] Cancer [] Arthritis  [] Other:              [x] Refer to full medical chart  In EPIC     Tests: [x] X-Ray:  7/3/2023 FINDINGS:  Three weightbearing views (AP, Mortise, and Lateral) of the right ankle and three weightbearing views (AP, Oblique, Lateral) of the right foot were obtained in the office today and reviewed, revealing no acute fracture, dislocation, or radioopaque foreign body/tumor. The ankle mortise is maintained with no widening of the clear spaces. Overall alignment is satisfactory. Questionable subtle lucency in the lateral aspect of the talar dome, possibly representing osteochondral defect. IMPRESSION:  No acute fracture/dislocation.

## 2023-10-18 ENCOUNTER — HOSPITAL ENCOUNTER (OUTPATIENT)
Dept: PHYSICAL THERAPY | Facility: CLINIC | Age: 53
Setting detail: THERAPIES SERIES
Discharge: HOME | End: 2023-10-18
Payer: COMMERCIAL

## 2023-10-18 PROCEDURE — 97016 VASOPNEUMATIC DEVICE THERAPY: CPT

## 2023-10-18 PROCEDURE — 97110 THERAPEUTIC EXERCISES: CPT

## 2023-10-18 PROCEDURE — 97140 MANUAL THERAPY 1/> REGIONS: CPT

## 2023-10-25 ENCOUNTER — HOSPITAL ENCOUNTER (OUTPATIENT)
Dept: PHYSICAL THERAPY | Facility: CLINIC | Age: 53
Setting detail: THERAPIES SERIES
Discharge: HOME | End: 2023-10-25
Payer: COMMERCIAL

## 2023-10-25 PROCEDURE — 97110 THERAPEUTIC EXERCISES: CPT

## 2023-10-25 PROCEDURE — 97016 VASOPNEUMATIC DEVICE THERAPY: CPT

## 2023-11-01 ENCOUNTER — HOSPITAL ENCOUNTER (OUTPATIENT)
Dept: PHYSICAL THERAPY | Facility: CLINIC | Age: 53
Setting detail: THERAPIES SERIES
Discharge: HOME | End: 2023-11-01
Payer: COMMERCIAL

## 2023-11-01 PROCEDURE — 97110 THERAPEUTIC EXERCISES: CPT

## 2023-11-01 NOTE — FLOWSHEET NOTE
reps  - Modified Side Plank with Hip Abduction  - 2 x daily - 5 x weekly - 2 sets - 10 reps  - Lateral Step Down  - 2 x daily - 5 x weekly - 2 sets - 10 reps  - Step Downs  - 2 x daily - 5 x weekly - 2 sets - 10 reps  - Forward Step Down  - 2 x daily - 5 x weekly - 2 sets - 10 reps  - Side Stepping with Resistance at Feet  - 2 x daily - 5 x weekly - 4 sets - 20 reps  Comprehension of Education:  [] Verbalizes understanding. [] Demonstrates understanding. [] Needs review. [x] Demonstrates/verbalizes HEP/Ed previously given. Plan: [x] Continue per plan of care. 1x/wk   [] Other:     Time In: 5:02 PM          Time Out: 5:57 pm    Electronically signed by:   Liseth Manrique PTA

## 2023-11-08 ENCOUNTER — HOSPITAL ENCOUNTER (OUTPATIENT)
Dept: PHYSICAL THERAPY | Facility: CLINIC | Age: 53
Setting detail: THERAPIES SERIES
Discharge: HOME | End: 2023-11-08
Payer: COMMERCIAL

## 2023-11-08 NOTE — FLOWSHEET NOTE
[] [address]  [address] [facility]  [phone]  F: [phone] [] 204 [facility]  [address]   [address]  P: [phone]  F: [phone] [] [address]  151 [facility]  P: [phone]  F: [phone] [] [place]: [phone]  F: [phone] [] 224 [facility]  [address]   P: [phone]  F: [phone]  [] [address]   P: [phone]  F: [phone] [] [address] [facility]  [address]   [address]  P: [phone]  F: [phone] [] [address] [facility]  [address]  [place]: [phone]  F: [phone] [] [address]  [place]: [phone]  F: [phone]      Therapy Cancel/No Show note    Date: 2023  Patient: Drea Street  : 1970  MRN: [id number]    Cancels/No Shows to date:     For today's appointment patient:    [x]  Cancelled    [] Rescheduled appointment    [] No-show     Reason given by patient:    [x]  Patient ill    []  Conflicting appointment    [] No transportation      [] Conflict with work    [] No reason given    [] Weather related    [] [id number]    [] Other:      Comments:        [] Next appointment was confirmed    Electronically signed by: Shalonda Muro PT

## 2023-11-15 ENCOUNTER — HOSPITAL ENCOUNTER (OUTPATIENT)
Dept: PHYSICAL THERAPY | Facility: CLINIC | Age: 53
Setting detail: THERAPIES SERIES
Discharge: HOME | End: 2023-11-15
Payer: COMMERCIAL

## 2023-11-15 PROCEDURE — 97110 THERAPEUTIC EXERCISES: CPT

## 2023-11-22 ENCOUNTER — HOSPITAL ENCOUNTER (OUTPATIENT)
Dept: PHYSICAL THERAPY | Facility: CLINIC | Age: 53
Setting detail: THERAPIES SERIES
Discharge: HOME | End: 2023-11-22
Payer: COMMERCIAL

## 2023-11-22 PROCEDURE — 97110 THERAPEUTIC EXERCISES: CPT

## 2023-11-22 NOTE — FLOWSHEET NOTE
Abduction  - 2 x daily - 5 x weekly - 2 sets - 10 reps  - Lateral Step Down  - 2 x daily - 5 x weekly - 2 sets - 10 reps  - Step Downs  - 2 x daily - 5 x weekly - 2 sets - 10 reps  - Forward Step Down  - 2 x daily - 5 x weekly - 2 sets - 10 reps  - Side Stepping with Resistance at Feet  - 2 x daily - 5 x weekly - 4 sets - 20 reps  Comprehension of Education:  [] Verbalizes understanding. [] Demonstrates understanding. [] Needs review. [x] Demonstrates/verbalizes HEP/Ed previously given. Plan: [x] Continue per plan of care.  1x/wk   [] Other:     Time In:  8170         Time Out: 1800    Electronically signed by:  Steve Johansen PT

## 2023-11-29 ENCOUNTER — HOSPITAL ENCOUNTER (OUTPATIENT)
Dept: PHYSICAL THERAPY | Facility: CLINIC | Age: 53
Setting detail: THERAPIES SERIES
Discharge: HOME | End: 2023-11-29
Payer: COMMERCIAL

## 2023-11-29 PROCEDURE — 97110 THERAPEUTIC EXERCISES: CPT

## 2024-01-16 ENCOUNTER — CLINICAL DOCUMENTATION (OUTPATIENT)
Dept: PHYSICAL THERAPY | Facility: CLINIC | Age: 54
End: 2024-01-16

## 2024-01-16 NOTE — FLOWSHEET NOTE
[] [facility]  Outpatient Rehabilitation &  Therapy  [address]  [phone]  F: [phone] [] [facility]  Outpatient Rehabilitation &  Therapy  [address]   [address]  P: [phone]  F: [phone] [x] [facility]  Outpatient Rehabilitation &  Therapy  [address]  P: [phone]  F: [phone] [] [facility]  Outpatient Rehabilitation &  Therapy  [address]  P: [phone]  F: [phone] [] [facility]  Outpatient Rehabilitation &  Therapy  [address]   P: [phone]  F: [phone]  [] [facility]  Outpatient Rehabilitation &  Therapy  [address]   P: [phone]  F: [phone] [] [facility]  Outpatient Rehabilitation &  Therapy  [address]  P: [phone]  F: [phone] [] [facility]  Outpatient Rehabilitation &  Therapy  [address] [facility]  [address]  P: [phone]  F: [phone] [] [facility]  Outpatient Rehabilitation &  Therapy  [address]  P: [phone]  F: [phone]      Physical Therapy Discharge Note    Date: 2024      Patient: Susan Li  : 1970  MRN: [id number]    Physician: Dr. Cordell Diop                            Insurance: [employer]; Jorden yr; 110/110 remaining vs; no auth req; hard max; not comb; 20% coins; ded met; 3000/832.50 remaining OOP  Medical Diagnosis:   R ankle stability          Rehab Codes: M25.571, M25.5671, R26.89  Onset date: 7/3/23                                             Next Dr's appt.:none  Total visits attended: 6  Cancels/No shows: /0  Date of initial visit: 10/16/23                Date of final visit: 23      Subjective:  Pain:  [] Yes  [x] No   Location:         Pain Rating: (0-10 scale) 0/10  Pain altered Tx:  [x] No  [] Yes  Action:  Comments: Pt states she was

## 2024-10-25 SDOH — HEALTH STABILITY: PHYSICAL HEALTH: ON AVERAGE, HOW MANY MINUTES DO YOU ENGAGE IN EXERCISE AT THIS LEVEL?: 60 MIN

## 2024-10-25 SDOH — HEALTH STABILITY: PHYSICAL HEALTH: ON AVERAGE, HOW MANY DAYS PER WEEK DO YOU ENGAGE IN MODERATE TO STRENUOUS EXERCISE (LIKE A BRISK WALK)?: 4 DAYS

## 2024-10-28 ENCOUNTER — OFFICE VISIT (OUTPATIENT)
Dept: ORTHOPEDIC SURGERY | Age: 54
End: 2024-10-28
Payer: COMMERCIAL

## 2024-10-28 VITALS — HEIGHT: 63 IN | WEIGHT: 130 LBS | RESPIRATION RATE: 14 BRPM | BODY MASS INDEX: 23.04 KG/M2

## 2024-10-28 DIAGNOSIS — M76.821 INSUFFICIENCY OF RIGHT POSTERIOR TIBIAL TENDON: Primary | ICD-10-CM

## 2024-10-28 DIAGNOSIS — M25.371 INSTABILITY OF RIGHT ANKLE JOINT: ICD-10-CM

## 2024-10-28 PROBLEM — M76.829 POSTERIOR TIBIALIS TENDON INSUFFICIENCY: Status: ACTIVE | Noted: 2024-10-28

## 2024-10-28 PROCEDURE — 99213 OFFICE O/P EST LOW 20 MIN: CPT | Performed by: STUDENT IN AN ORGANIZED HEALTH CARE EDUCATION/TRAINING PROGRAM

## 2024-10-28 NOTE — PROGRESS NOTES
[facility]  [address]  [facility] [facility] [place] [address]  Dept Phone: [phone]  Dept Fax: [phone]      Orthopaedic Trauma New Patient      CHIEF COMPLAINT:    Chief Complaint   Patient presents with    Follow-up     Right ankle pain       HISTORY OF PRESENT ILLNESS:    The patient is a 54 y.o. female who is being seen as a new patient for right foot/ankle discomfort.  Patient was previously seen by Dr. Diop in 7/3/2023.  At that time, she was provided a brace, and sent to therapy.  Patient states that her right ankle discomfort began approximately 2 years ago.  She states at that time, she was running fell, sustained a right distal radius fracture.  She states that she significantly twisted her ankle at that time, and her wrist did require fixation.  Patient states that overall she was doing well, till last year, when she had increasing pain, and saw Dr. Diop.  Patient did attend therapy, and was provided a brace, for which she states did help.  She is very active, and runs upwards of 15 miles throughout the week at different intervals.  Patient states that she has this pulling sensation within her ankle, and is very tight.  Patient is inquired if she can go back to therapy.      Past Medical History:    No past medical history on file.    Past Surgical History:    Past Surgical History:   Procedure Laterality Date     SECTION      x3    TONSILLECTOMY      WRIST CLOSED REDUCTION N/A 2023    LEFT DISTAL RADIUS CLOSED REDUCTION AND IMMOBILIZATION performed by Cody Lake MD at [facility] OR    WRIST FRACTURE SURGERY Left 2023    LEFT DISTAL RADIUS CLOSED REDUCTION AND IMMOBILIZATION       Current Medications:   Current Outpatient Medications   Medication Sig Dispense Refill    Calcium Carbonate (CALCIUM 500 PO) Take by mouth daily       No current facility-administered medications for this visit.       Allergies:    Patient has no known

## 2024-11-06 ENCOUNTER — HOSPITAL ENCOUNTER (OUTPATIENT)
Dept: PHYSICAL THERAPY | Facility: CLINIC | Age: 54
Setting detail: THERAPIES SERIES
Discharge: HOME | End: 2024-11-06
Attending: STUDENT IN AN ORGANIZED HEALTH CARE EDUCATION/TRAINING PROGRAM
Payer: COMMERCIAL

## 2024-11-06 PROCEDURE — 97110 THERAPEUTIC EXERCISES: CPT

## 2024-11-06 PROCEDURE — 97161 PT EVAL LOW COMPLEX 20 MIN: CPT

## 2024-11-06 NOTE — CONSULTS
Hips to remain in neutral to ext          Sidelying       Clams 90/30 deg *      Verónica hip abd *      Gym       Monster walks *      Step downs *   Posterior and lateral   Calf stretch on SB *      MOBO *                    Other:  Manual   Add STM to R calf  Specific Instructions for next treatment:   Add STM to R calf  Advance ex  Finish with game ready    Treatment Charges: Mins Units Time In/Out    Evaluation         Low         Moderate         High           Modalities           Ther Exercise  20         Neuromuscular Re-ed           Gait Training           Manual Therapy           Ther Activities           Aquatics           Vasocompression          Cervical Traction           Other         Total Billable time 55 min   2         TOTAL TREATMENT TIME: 55    Time in: 1200  Time Out:1300    Electronically signed by: Dane Chang PT        Physician Signature:________________________________Date:__________________  By signing above or cosigning this note, I have reviewed this plan of care and certify a need for medically necessary rehabilitation services.     *PLEASE SIGN ABOVE AND FAX BACK ALL PAGES*

## 2024-11-13 ENCOUNTER — HOSPITAL ENCOUNTER (OUTPATIENT)
Dept: PHYSICAL THERAPY | Facility: CLINIC | Age: 54
Setting detail: THERAPIES SERIES
Discharge: HOME | End: 2024-11-13
Attending: STUDENT IN AN ORGANIZED HEALTH CARE EDUCATION/TRAINING PROGRAM
Payer: COMMERCIAL

## 2024-11-13 PROCEDURE — 97016 VASOPNEUMATIC DEVICE THERAPY: CPT

## 2024-11-13 PROCEDURE — 97110 THERAPEUTIC EXERCISES: CPT

## 2024-11-13 NOTE — FLOWSHEET NOTE
squirrels  - 2 x daily - 7 x weekly - 1 sets - 10 reps - 10 seconds hold  - Side Stepping with Resistance at Feet  - 2 x daily - 7 x weekly - 4 sets - 20 reps  - Lateral Step Down  - 2 x daily - 7 x weekly - 2 sets - 10-15 reps  - calf stretch with towel under the FRONT OF THE  FOOT  - 2 x daily - 7 x weekly - 1 sets - 3 reps - 30 second hold  - Ankle Inversion with Resistance  - 2 x daily - 7 x weekly - 3 sets - 10 reps  - Isometric Ankle Inversion  - 2 x daily - 7 x weekly - 1 sets - 10 reps  Comprehension of Education:  [] Verbalizes understanding.  [] Demonstrates understanding.  [x] Needs review.  [] Demonstrates/verbalizes HEP/Ed previously given.     Plan: [x] Continue per plan of care. 1x/wk   [] Other:     Time In: 1200          Time Out: 1300    Electronically signed by:  Dane Chang, PT

## 2024-11-20 ENCOUNTER — HOSPITAL ENCOUNTER (OUTPATIENT)
Dept: PHYSICAL THERAPY | Facility: CLINIC | Age: 54
Setting detail: THERAPIES SERIES
Discharge: HOME | End: 2024-11-20
Attending: STUDENT IN AN ORGANIZED HEALTH CARE EDUCATION/TRAINING PROGRAM
Payer: COMMERCIAL

## 2024-11-20 PROCEDURE — 97140 MANUAL THERAPY 1/> REGIONS: CPT

## 2024-11-20 PROCEDURE — 97110 THERAPEUTIC EXERCISES: CPT

## 2024-11-20 NOTE — FLOWSHEET NOTE
[x] [facility]  Outpatient Rehabilitation &  Therapy  [address]  P: [phone]  F: [phone]     Physical Therapy Daily Treatment Note    Date:  2024  Patient Name:  Susan Li    :  1970  MRN: [id number]  Physician: Dr. Shawn David                        Insurance:  [employer] PPO: ruben yr - 110 / 110 vs remain; no auth required; no copay; ded met; coins 20%; oop 3000/  remain   Medical Diagnosis: R post tib tendency      Rehab Codes: M76.821  Onset date:    10/2/24                                   Next Dr's appt.:24  Visit# / total visits: 3/10   Cancels/No Shows: 0/0    Subjective:    Pain:  [x] Yes  [] No Location: R medial ankle/foot Pain Rating: (0-10 scale) 1/10;   Pain altered Tx:  [x] No  [] Yes  Action:  Comments: feeling very good.  Walking barefoot is getting better.   Not running as much, HEP, icing, and using theragun.   Ran 6 miles + walk 9 miles last week.  No pain at all with walking.  No brace when running. Calves were very fatigued/sore after her last visit.    Objective:  Modalities:   Treatment Location  Left      Right                          Position   ankle []          [x]  [x] Supine    [] Prone   [] Side lying  [] Sitting                                            Treatment Modality   3 Vasocompression    34° temp    Med pressure     15min   1,2 Other:  manual, briana         Precautions: standard  Exercises:  Exercise Reps/ Time Weight/ Level   Comments   Prone           Foot push 15x    x 2 pillows               Supine           1 legged bridges 15    x             Hips to remain in neutral to ext   Sidelying           Clams 90/30 deg 1 set to fatigue   x     Verónica hip abd 1 set to fatigue   x     Gym           Calf raises w/ ball  2x15 Off step x 1.5 lb ball b/w heels 30\" rest   Monster walks 4x20' black X Issued black TB; 3 lb in outstretched arm   BAPS 2x10  1x10 L2  L3 X  X    Hip hikes 1x fatigue 4 lbs/4\" X    Step downs

## 2024-11-25 ENCOUNTER — HOSPITAL ENCOUNTER (OUTPATIENT)
Dept: PHYSICAL THERAPY | Facility: CLINIC | Age: 54
Setting detail: THERAPIES SERIES
Discharge: HOME | End: 2024-11-25
Attending: STUDENT IN AN ORGANIZED HEALTH CARE EDUCATION/TRAINING PROGRAM
Payer: COMMERCIAL

## 2024-11-25 NOTE — FLOWSHEET NOTE
[] [facility]  Outpatient Rehabilitation &  Therapy  [address]  [phone]  [phone] [] [facility]  Outpatient Rehabilitation &  Therapy  [address]  P: [phone]  F: [phone] [] [facility]  Outpatient Rehabilitation &  Therapy  [address]  P: [phone]  F: [phone] [] [facility]  Outpatient Rehabilitation &  Therapy  [address]  [phone]  [phone] [] [facility]  Outpatient Rehabilitation &  Therapy  [address]   P: [phone]  F: [phone]  [] [facility]  Outpatient Rehabilitation &  Therapy  [address]  P: [phone]  F: [phone] [] [facility]  Outpatient Rehabilitation &  Therapy  [address]  P: [phone]  F: [phone] [] [facility]  Outpatient Rehabilitation &  Therapy  [address]  P: [phone]  F: [phone] [] [facility]  Outpatient Rehabilitation &  Therapy  [address]  P: [phone]  F: [phone]  [] [facility] Outpatient Rehabilitation &  Therapy  [address]  P: [phone]  F: [phone]     Therapy Cancel/No Show note    Date: 2024  Patient: Susan Li  : 1970  MRN: [id number]    Cancels/No Shows to date:     For today's appointment patient:    []  Cancelled    [] Rescheduled appointment    [] No-show     Reason given by patient:    [x]  Patient ill    []  Conflicting appointment    [] No transportation      [] Conflict with work    [] No reason given    [] Weather related    [] COVID-19    [x] Other:      Comments:  got a shingles vaccine and doesn't feel well      [] Next appointment was confirmed    Electronically signed by: Dane Chang, PT

## 2024-12-06 ENCOUNTER — HOSPITAL ENCOUNTER (OUTPATIENT)
Dept: PHYSICAL THERAPY | Facility: CLINIC | Age: 54
Setting detail: THERAPIES SERIES
Discharge: HOME | End: 2024-12-06
Attending: STUDENT IN AN ORGANIZED HEALTH CARE EDUCATION/TRAINING PROGRAM
Payer: COMMERCIAL

## 2024-12-06 PROCEDURE — 97110 THERAPEUTIC EXERCISES: CPT

## 2024-12-06 NOTE — FLOWSHEET NOTE
[x] [facility]  Outpatient Rehabilitation &  Therapy  [address]  P: [phone]  F: [phone]     Physical Therapy Daily Treatment Note    Date:  2024  Patient Name:  Susan Li    :  1970  MRN: [id number]  Physician: Dr. Shawn David                        Insurance:  [employer] PPO: ruben yr - 110 / 110 vs remain; no auth required; no copay; ded met; coins 20%; oop 3000/  remain   Medical Diagnosis: R post tib tendency      Rehab Codes: M76.821  Onset date:    10/2/24                                   Next Dr's appt.:24  Visit# / total visits: 4/10   Cancels/No Shows: 0/0    Subjective:    Pain:  [x] Yes  [] No Location: R medial ankle/foot Pain Rating: (0-10 scale) 1/10 (ran 3 miles this am)   Pain altered Tx:  [x] No  [] Yes  Action:  Comments: feeling very good.  Walking barefoot continues to improve slightly   Not running as much, HEP, icing, and using theragun.   Ran 6 miles ( 2 runs)  walk 3 miles this  week.  No pain at all with walking.  No brace when running. Calves were LESS  fatigued/sore after her last visit.    Objective:  Modalities:   Treatment Location  Left      Right                          Position   ankle []          [x]  [x] Supine    [] Prone   [] Side lying  [] Sitting                                            Treatment Modality   3 Vasocompression    34° temp    Med pressure     15min   1,2 Other:  manual, briana         Precautions: standard  Exercises:  Exercise Reps/ Time Weight/ Level   Comments   Prone           Foot push 15x    x 2 pillows               Supine           1 legged bridges 15    x      R inversion 3x10 blue xx    Sidelying           Clams 90/30 deg 1 set to fatigue   x     Verónica hip abd 1 set to fatigue   x     Gym           Calf raises w/ ball  2x15 Off step x 1.5 lb ball b/w heels 30\" rest   Monster walks 4x20' black -- Issued black TB; 3 lb in outstretched arm   BAPS 3x10 L3 xx    Hip hikes 1x fatigue 4 lbs/4\"

## 2024-12-20 NOTE — FLOWSHEET NOTE
[] [facility]  Outpatient Rehabilitation &  Therapy  [address]  [phone]  [phone] [] [facility]  Outpatient Rehabilitation &  Therapy  [address]  P: [phone]  F: [phone] [] [facility]  Outpatient Rehabilitation &  Therapy  [address]  P: [phone]  F: [phone] [] [facility]  Outpatient Rehabilitation &  Therapy  [address]  [phone]  [phone] [] [facility]  Outpatient Rehabilitation &  Therapy  [address]   P: [phone]  F: [phone]  [] [facility]  Outpatient Rehabilitation &  Therapy  [address]  P: [phone]  F: [phone] [] [facility]  Outpatient Rehabilitation &  Therapy  [address]  P: [phone]  F: [phone] [] [facility]  Outpatient Rehabilitation &  Therapy  [address]  P: [phone]  F: [phone] [] [facility]  Outpatient Rehabilitation &  Therapy  [address]  P: [phone]  F: [phone]  [] [facility] Outpatient Rehabilitation &  Therapy  [address]  P: [phone]  F: [phone]     Therapy Cancel/No Show note    Date: 2024  Patient: Susan Li  : 1970  MRN: [id number]    Cancels/No Shows to date:     For today's appointment patient:    [x]  Cancelled    [] Rescheduled appointment    [] No-show     Reason given by patient:    [x]  Patient ill    []  Conflicting appointment    [] No transportation      [] Conflict with work    [] No reason given    [] Weather related    [] COVID-19    [] Other:      Comments:  she is going to follow up with Dr. David and discuss MRI    [] Next appointment was confirmed    Electronically signed by: Dane Chang, PT

## 2024-12-23 ENCOUNTER — OFFICE VISIT (OUTPATIENT)
Dept: ORTHOPEDIC SURGERY | Age: 54
End: 2024-12-23
Payer: COMMERCIAL

## 2024-12-23 ENCOUNTER — HOSPITAL ENCOUNTER (OUTPATIENT)
Dept: PHYSICAL THERAPY | Facility: CLINIC | Age: 54
Setting detail: THERAPIES SERIES
Discharge: HOME | End: 2024-12-23
Attending: STUDENT IN AN ORGANIZED HEALTH CARE EDUCATION/TRAINING PROGRAM
Payer: COMMERCIAL

## 2024-12-23 VITALS — HEIGHT: 63 IN | BODY MASS INDEX: 23.04 KG/M2 | RESPIRATION RATE: 14 BRPM | WEIGHT: 130 LBS

## 2024-12-23 DIAGNOSIS — M95.8 OSTEOCHONDRAL DEFECT OF ANKLE: ICD-10-CM

## 2024-12-23 DIAGNOSIS — M76.821 INSUFFICIENCY OF RIGHT POSTERIOR TIBIAL TENDON: Primary | ICD-10-CM

## 2024-12-23 PROCEDURE — 99213 OFFICE O/P EST LOW 20 MIN: CPT | Performed by: PHYSICIAN ASSISTANT

## 2024-12-23 NOTE — PROGRESS NOTES
[facility]  [address]  [place] [address]  Dept Phone: [phone]  Dept Fax: [phone]      Orthopaedic Trauma Clinic Follow Up      Subjective:       Susan Li is a 54 y.o. year old female who presents to the clinic today for follow up chronic right foot and ankle pain.  Patient was last seen by Dr. David on 10/28/2024 referred back to physical therapy for strengthening and instructed to added lower impact cardiovascular exercises to help with recovery as she continues to have pain with running.    Patient returns today stating that she is overall doing slightly better with PT but still unable to run much more than about 3 miles before onset of pain.  She also notes pain to the medial ankle and foot with any barefoot walking.  Walking or standing in regular tennis shoes seems to cause minimal pain.    Patient reports she has been dealing with this pain for over a year now and it does seem to wax and wane based on her activity level and running mileage.  She reports that she is unable to participate in nearly as many miles that she would like to and deaf and would not feel comfortable participating in a half marathon or longer race which she did on a regular basis prior to the onset of her pain last year.  She denies any new interval injury or trauma.  She denies any significant numbness or tingling.    Review of Systems  Gen: no fever, chills, malaise  CV: no chest pain or palpitations  Resp: no cough or shortness of breath  GI: no nausea, vomiting, diarrhea, or constipation  Neuro: no seizures, vertigo, or headache  Msk: Chronic right ankle pain  10 remaining systems reviewed and negative    Objective :     Vitals:    12/23/24 0807   Resp: 14   Body mass index is 23.03 kg/m².  General: No acute distress, resting comfortably in the clinic  Neuro: alert. oriented  Eyes: Extra-ocular muscles intact  Pulm: Respirations unlabored and regular.  Skin: warm, well

## 2024-12-30 ENCOUNTER — HOSPITAL ENCOUNTER (OUTPATIENT)
Dept: MRI IMAGING | Age: 54
Discharge: HOME | End: 2025-01-01
Payer: COMMERCIAL

## 2024-12-30 DIAGNOSIS — M76.821 INSUFFICIENCY OF RIGHT POSTERIOR TIBIAL TENDON: ICD-10-CM

## 2024-12-30 DIAGNOSIS — M95.8 OSTEOCHONDRAL DEFECT OF ANKLE: ICD-10-CM

## 2024-12-30 PROCEDURE — 73721 MRI JNT OF LWR EXTRE W/O DYE: CPT

## 2025-01-08 ENCOUNTER — OFFICE VISIT (OUTPATIENT)
Dept: ORTHOPEDIC SURGERY | Age: 55
End: 2025-01-08
Payer: COMMERCIAL

## 2025-01-08 VITALS — BODY MASS INDEX: 23.04 KG/M2 | WEIGHT: 130 LBS | HEIGHT: 63 IN

## 2025-01-08 DIAGNOSIS — M25.371 INSTABILITY OF RIGHT ANKLE JOINT: ICD-10-CM

## 2025-01-08 DIAGNOSIS — M76.821 INSUFFICIENCY OF RIGHT POSTERIOR TIBIAL TENDON: Primary | ICD-10-CM

## 2025-01-08 PROCEDURE — 99213 OFFICE O/P EST LOW 20 MIN: CPT | Performed by: PHYSICIAN ASSISTANT

## 2025-01-08 NOTE — PROGRESS NOTES
[facility] Orthopedics & Sports Medicine    Stef Epperson PA-C    Chief Compliant:  Chief Complaint   Patient presents with    Ankle Pain     R Ankle Pain        History of Present Illness:  Susan returns today.  This is a 54 y.o. female who presents to the clinic today for follow up of chronic right ankle pain.  Patient is here for follow-up of MRI right ankle.  Patient is an avid runner but due to the worsening pain in her ankle she has been unable to return to running greater than about 3 miles despite extensive rest and physical therapy.    At last follow-up on 12/23/2024 an MRI was ordered due to failure of conservative measures.  MRI was completed on 12/30/2024 is here to discuss results and treatment options going forward.  She reports of the last 2 weeks pain has remained relatively unchanged.  Most severe over the medial ankle and foot.  She has invested in a new pair of walking/running shoes but has had minimal improvement in her pain.      Review of Systems   Constitutional: Negative for fever, chills, sweats, recent illness, or recent injury.   Neurological: Negative for headaches, numbness, or weakness.   Integumentary: Negative for rash, itching, ecchymosis, abrasions, or laceration.   Musculoskeletal: Positive for Ankle Pain (R Ankle Pain)       Physical Exam:  Constitutional: Patient is oriented to person, place, and time. Patient appears well-developed and well nourished.   Musculoskeletal:    Right Lower Extremity: Skin intact without evidence of erythema or ecchymosis.  Mild tenderness to palpation over the posterior tibialis tendon. Mild planovalgus foot positioning.  5 out of 5 resisted plantarflexion and dorsiflexion.. Compartments soft/compressible. Extremity warm/well perfused. EHL/FHL/TA/GSC motor intact. Patient expresses normal sensation L3-S1 distribution   Neurological: Patient is alert and oriented to person, place, and time. Normal strenght. No sensory deficit.  Skin: Skin is warm

## 2025-01-17 NOTE — DISCHARGE SUMMARY
[] [facility]  Outpatient Rehabilitation &  Therapy  [address]  [phone]  [phone] [] [facility]  Outpatient Rehabilitation &  Therapy  [address]  P: [phone]  F: [phone] [x] [facility]  Outpatient Rehabilitation &  Therapy  [address]  P: [phone]  F: [phone] [] [facility]  Outpatient Rehabilitation &  Therapy  [address]  [phone]  [phone] [] [facility]  Outpatient Rehabilitation &  Therapy  [address]   P: [phone]  F: [phone]  [] [facility]  Outpatient Rehabilitation &  Therapy  [address]  P: [phone]  F: [phone] [] [facility]  Outpatient Rehabilitation &  Therapy  [address]  P: [phone]  F: [phone] [] [facility]  Outpatient Rehabilitation &  Therapy  [address]  P: [phone]  F: [phone] [] [facility]  Outpatient Rehabilitation &  Therapy  [address]  P: [phone]  F: [phone]  [] [facility] Outpatient Rehabilitation &  Therapy  [address]  P: [phone]  F: [phone]     Physical Therapy Discharge Note    Date: 2025      Patient: Susan Li   : 1970  MRN: [id number]    Physician: Dr. Shawn David                        Insurance:  [facility] PPO: ruben  vs remain; no auth required; no copay; ded met; coins 20%; oop 2020 remain   Medical Diagnosis: R post tib tendency       Rehab Codes: M76.821  Onset date:    10/2/24                                   Next 's appt.:24  Visit# / total visits: 4/10                                Cancels/No Shows: 0/0  Date of initial visit:  24                Date of final visit: 24      Assessment:  STG: (to be met in 5

## 2025-05-05 ENCOUNTER — HOSPITAL ENCOUNTER (OUTPATIENT)
Dept: PHYSICAL THERAPY | Facility: CLINIC | Age: 55
Setting detail: THERAPIES SERIES
Discharge: HOME | End: 2025-05-05
Payer: COMMERCIAL

## 2025-05-05 PROCEDURE — 97110 THERAPEUTIC EXERCISES: CPT

## 2025-05-05 PROCEDURE — 97161 PT EVAL LOW COMPLEX 20 MIN: CPT

## 2025-05-05 NOTE — CONSULTS
[x] [facility]  Outpatient Rehabilitation &  Therapy  [address]  P: [phone]  F: [phone] [] [facility]  Outpatient Rehabilitation &  Therapy  [address]  P: [phone]  F: [phone]        Physical Therapy Running Evaluation    Date:  2025  Patient: Susan Li   : 1970  MRN: [id number]  Referring provider: Dr. Vinicius Sandhu      Insurance:  BCBS visits 110/110 rem, no auh req, ded 750/met, oop 3000/2108.86 rem   Medical Diagnosis: R post tib tendon rupture  Rehab Codes: M79.671  Onset date:  10/2/24   Next 's appt.:none    Subjective:   CC:   R post tib dysfunction that is preventing her from running  HPI: copied from last note from Dr. Sandhu on :  Plan  We did discuss treatment options with the patient today. Overall she is doing well. We will go ahead and get her into physical therapy for post tib tendon protocol. We also will get her a order for [employer] orthotics and she can transition into those once she receives them. We will plan to see the patient back in 6-8 weeks for reassessment.  I,Vinicius Sandhu, personally performed the face to face evaluation on this patient. I discussed with the patient and confirmed the accuracy and completeness of the aforementioned history prepared by the [place] practice provider, and I personally performed the clinical examination of the patient. The critical element of all procedures were performed by me. I discussed the treatment plan with the patient. My examination, medical decision making and treatment plan are reflected above and are as follows.   She has noted that there has been improvement with this over time. Her swelling is improved significantly. She is able to do single leg heel rise. At this point I am going to have her transition into custom orthotics she might also consider getting orthotics through Podiatry so she has a lower profile 1 as well. I will see her back

## 2025-05-09 ENCOUNTER — HOSPITAL ENCOUNTER (OUTPATIENT)
Dept: PHYSICAL THERAPY | Facility: CLINIC | Age: 55
Setting detail: THERAPIES SERIES
Discharge: HOME | End: 2025-05-09
Payer: COMMERCIAL

## 2025-05-09 PROCEDURE — 97110 THERAPEUTIC EXERCISES: CPT

## 2025-05-09 NOTE — FLOWSHEET NOTE
[] [facility]  Outpatient Rehabilitation &  Therapy  [address]  [phone]  [phone] [] [facility]  Outpatient Rehabilitation &  Therapy  [address]  P: [phone]  F: [phone] [x] [facility]  Outpatient Rehabilitation &  Therapy  [address]  P: [phone]  F: [phone] [] [facility]  Outpatient Rehabilitation &  Therapy  [address]  [phone]  [phone] [] [facility]  Outpatient Rehabilitation &  Therapy  [address]   P: [phone]  F: [phone]  [] [facility]  Outpatient Rehabilitation &  Therapy  [address]  P: [phone]  F: [phone] [] [facility]  Outpatient Rehabilitation &  Therapy  [address]  P: [phone]  F: [phone] [] [facility]  Outpatient Rehabilitation &  Therapy  [address]  P: [phone]  F: [phone] [] [facility]  Outpatient Rehabilitation &  Therapy  [address]  P: [phone]  F: [phone]  [] [facility] Outpatient Rehabilitation &  Therapy  [address]  P: [phone]  F: [phone]     Physical Therapy Daily Treatment Note    Date:  2025  Patient Name:  Susan Li    :  1970  MRN: [id number]  Referring provider: Dr. Vinicius Sandhu                                     Insurance:  BCBS visits 110/110 rem, no auh req, ded 750/met, oop 3000/2108.86 rem   Medical Diagnosis: R post tib tendon rupture                      Rehab Codes: M79.671  Onset date:    10/2/24                                    Next 's appt.:none       Visit# / total visits: 2/; Progress note for Medicare patient due at visit      Cancels/No Shows:     Subjective:    Pain:  [] Yes  [] No Location:  Pain Rating: (0-10 scale) /10  Pain

## 2025-05-12 ENCOUNTER — APPOINTMENT (OUTPATIENT)
Dept: PHYSICAL THERAPY | Facility: CLINIC | Age: 55
End: 2025-05-12
Payer: COMMERCIAL

## 2025-05-13 ENCOUNTER — HOSPITAL ENCOUNTER (OUTPATIENT)
Dept: PHYSICAL THERAPY | Facility: CLINIC | Age: 55
Setting detail: THERAPIES SERIES
Discharge: HOME | End: 2025-05-13
Payer: COMMERCIAL

## 2025-05-13 PROCEDURE — 97110 THERAPEUTIC EXERCISES: CPT

## 2025-05-13 NOTE — FLOWSHEET NOTE
[] [facility]  Outpatient Rehabilitation &  Therapy  [address]  [phone]  [phone] [] [facility]  Outpatient Rehabilitation &  Therapy  [address]  P: [phone]  F: [phone] [x] [facility]  Outpatient Rehabilitation &  Therapy  [address]  P: [phone]  F: [phone] [] [facility]  Outpatient Rehabilitation &  Therapy  [address]  [phone]  [phone] [] [facility]  Outpatient Rehabilitation &  Therapy  [address]   P: [phone]  F: [phone]  [] [facility]  Outpatient Rehabilitation &  Therapy  [address]  P: [phone]  F: [phone] [] [facility]  Outpatient Rehabilitation &  Therapy  [address]  P: [phone]  F: [phone] [] [facility]  Outpatient Rehabilitation &  Therapy  [address]  P: [phone]  F: [phone] [] [facility]  Outpatient Rehabilitation &  Therapy  [address]  P: [phone]  F: [phone]  [] [facility] Outpatient Rehabilitation &  Therapy  [address]  P: [phone]  F: [phone]     Physical Therapy Daily Treatment Note    Date:  2025  Patient Name:  Susan Li    :  1970  MRN: [id number]  Referring provider: Dr. Vinicius Sandhu                                     Insurance:  BCBS visits 110/110 rem, no auh req, ded 750/met, oop 3000/2108.86 rem   Medical Diagnosis: R post tib tendon rupture                      Rehab Codes: M79.671  Onset date:    10/2/24                                    Next 's appt: none       Visit# / total visits: 3/ 12   Cancels/No Shows:     Subjective:    Pain:  [] Yes  [x] No Location: R post tib  Pain Rating: (0-10 scale) 0/10  Pain altered Tx:  [] No  [] Yes

## 2025-05-15 ENCOUNTER — HOSPITAL ENCOUNTER (OUTPATIENT)
Dept: PHYSICAL THERAPY | Facility: CLINIC | Age: 55
Setting detail: THERAPIES SERIES
Discharge: HOME | End: 2025-05-15
Payer: COMMERCIAL

## 2025-05-15 NOTE — FLOWSHEET NOTE
[] [facility]  Outpatient Rehabilitation &  Therapy  [address]  [phone]  [phone] [] [facility]  Outpatient Rehabilitation &  Therapy  [address]  P: [phone]  F: [phone] [x] [facility]  Outpatient Rehabilitation &  Therapy  [address]  P: [phone]  F: [phone] [] [facility]  Outpatient Rehabilitation &  Therapy  [address]  [phone]  [phone] [] [facility]  Outpatient Rehabilitation &  Therapy  [address]   P: [phone]  F: [phone]  [] [facility]  Outpatient Rehabilitation &  Therapy  [address]  P: [phone]  F: [phone] [] [facility]  Outpatient Rehabilitation &  Therapy  [address]  P: [phone]  F: [phone] [] [facility]  Outpatient Rehabilitation &  Therapy  [address]  P: [phone]  F: [phone] [] [facility]  Outpatient Rehabilitation &  Therapy  [address]  P: [phone]  F: [phone]  [] [facility] Outpatient Rehabilitation &  Therapy  [address]  P: [phone]  F: [phone]     Therapy Cancel/No Show note    Date: 5/15/2025  Patient: Susan Li  : 1970  MRN: [id number]    Cancels/No Shows to date: 10    For today's appointment patient:    [x]  Cancelled    [] Rescheduled appointment    [] No-show     Reason given by patient:    []  Patient ill    []  Conflicting appointment    [] No transportation      [] Conflict with work    [x] No reason given    [] Weather related    [] COVID-19    [] Other:      Comments:        [x] Next appointment was confirmed    Electronically signed by: Juan Christiansen PTA

## 2025-05-19 ENCOUNTER — HOSPITAL ENCOUNTER (OUTPATIENT)
Dept: PHYSICAL THERAPY | Facility: CLINIC | Age: 55
Setting detail: THERAPIES SERIES
Discharge: HOME | End: 2025-05-19
Payer: COMMERCIAL

## 2025-05-19 PROCEDURE — 97110 THERAPEUTIC EXERCISES: CPT

## 2025-05-19 NOTE — FLOWSHEET NOTE
[] [facility]  Outpatient Rehabilitation &  Therapy  [address]  [phone]  [phone] [] [place]  Outpatient Rehabilitation &  Therapy  [address]  P: [phone]  F: [phone] [x] [facility]  Outpatient Rehabilitation &  Therapy  [address]  P: [phone]  F: [phone]     Physical Therapy Daily Treatment Note    Date:  2025  Patient Name:  Susan Li     :  1970  MRN: [id number]  Referring provider: Dr. Vinicius Sandhu                                     Insurance:  BC visits 110/110 rem, no auh req, ded 750/met, oop 3000/2108.86 rem   Medical Diagnosis: R post tib tendon rupture        Rehab Codes: M79.671  Onset date:    10/2/24                                     Next 's appt: none     Visit# / total visits:     Cancels/No Shows: 1/0    Subjective:    Pain:  [] Yes  [x] No Location: R post tib  Pain Rating: (0-10 scale) 0/10  Pain altered Tx:  [] No  [] Yes  Action:  Comments: enters with AFO, but no orthotics yet.  No pain.  Continues to walk but no running.  Ex are getting easier     Objective:  Modalities: Game Ready PRN  Precautions: standard  Exercises:  Exercise Reps/ Time Weight/ Level   Comments               Sidelying           Clams 90/30 deg 20    Ankles on ball-Add resistance next    Verónica hip abd 3x6 4 lbs xx W/ board          Gym           Post tib calf raises 2x15  xx    3 Way Hip  2x10 blue xx R in CKC; issued blue   Monster walks 4x20' blue xx Issued BLUE band   Step downs 15x  6\" xx Posterior and lateral-watch for valgus   Heel taps 15x 4\" xx    MOBO           -calf stretch 3x30\" 1/2 xx     -functional reach 10x 2/4 xx    -RDLs 10x 2/4 xx    -Rocks 20x ea /4, 13 xx     R legged calf raises 3x10 2/4 xx    Other:             Left Right RIght      Hip Ext 54.5 57.8 47.4      ER 41.1 34.4 48.7      Prone ER 19.0 17.7 20.2      ABD 29.2 30.1

## 2025-05-23 ENCOUNTER — HOSPITAL ENCOUNTER (OUTPATIENT)
Dept: PHYSICAL THERAPY | Facility: CLINIC | Age: 55
Setting detail: THERAPIES SERIES
Discharge: HOME | End: 2025-05-23
Payer: COMMERCIAL

## 2025-05-23 NOTE — FLOWSHEET NOTE
05/19/2025  Prepared by: Dane Chang     Program Notes  Susan Thoranayadeangelo  massage with thumbs to post tib  NO LEG CROSSING OR SASSY HIPSMOBO Board-fins at 2/4Single leg balance 3x30\"Rocks 2/4 and 1/3-20x each1 legged calf raises 3x10 w/ fins at 1/4. don't let calf fall inward.reach 10x to front/side/back-fins at 1/2 Calf stretch 3x30\"     Exercises  - Single Leg Bridge  - 1 x daily - 5 x weekly - 2 sets - 10 reps  - Side Stepping with Resistance at Feet  - 1 x daily - 5 x weekly - 4 sets - 20 reps  - Ankle Inversion with Resistance  - 1 x daily - 5 x weekly - 3 sets - 10 reps  - Standing Calf Raise With Small Ball at Heels  - 1 x daily - 5 x weekly - 3 sets - 10 reps  - Sidelying Hip Abduction at Wall  - 1 x daily - 5 x weekly - 3 sets - 10 reps  - Clamshells with a ball under your feet  - 1 x daily - 5 x weekly - 3 sets - 6-8 reps  - Lateral Step Down  - 1 x daily - 5 x weekly - 2 sets - 10 reps  - Forward Step Down  - 1 x daily - 5 x weekly - 2 sets - 10 reps  - Backward Step Down  - 1 x daily - 5 x weekly - 2 sets - 10 reps     Comprehension of Education:  [x] Verbalizes understanding.  [] Demonstrates understanding.  [] Needs review.  [] Demonstrates/verbalizes HEP/Ed previously given.              Plan:    [x] Continue current frequency toward long and short term goals.    [x] Specific Instructions for subsequent treatments: See above      Time In:1:00 pm           Time Out: 2:00 pm    Electronically signed by:  Jason Howe, SPT     Evaluation/treatment performed by Student PT under the supervision of co-signing PT who agrees with all evaluation/treatment and documentation.

## 2025-05-28 ENCOUNTER — APPOINTMENT (OUTPATIENT)
Dept: PHYSICAL THERAPY | Facility: CLINIC | Age: 55
End: 2025-05-28
Payer: COMMERCIAL

## 2025-05-30 ENCOUNTER — HOSPITAL ENCOUNTER (OUTPATIENT)
Dept: PHYSICAL THERAPY | Facility: CLINIC | Age: 55
Setting detail: THERAPIES SERIES
Discharge: HOME | End: 2025-05-30
Payer: COMMERCIAL

## 2025-05-30 PROCEDURE — 97110 THERAPEUTIC EXERCISES: CPT

## 2025-05-30 NOTE — FLOWSHEET NOTE
[x] [facility]  Outpatient Rehabilitation &  Therapy  [address]  P: [phone]  F: [phone]     Physical Therapy Daily Treatment Note    Date:  2025  Patient Name:  Susan Li    :  1970  MRN: [id number]  Referring provider: Dr. Vinicius Sandhu                                     Insurance:  BCBS visits 110/110 rem, no auh req, ded 750/met, oop 3000/2108.86 rem   Medical Diagnosis: R post tib tendon rupture          Rehab Codes: M79.671  Onset date:    10/2/24                                               Next 's appt: none                           Visit# / total visits:                                             Cancels/No Shows: 1/0     Subjective:    Pain:  [] Yes  [x] No   Location: R post tib     Pain Rating: (0-10 scale) 0/10  Pain altered Tx:  [x] No  [] Yes  Action:  Comments: Pt reporting only muscle soreness after last visit. No pain at this time, compliant with HEP.          Objective:  Modalities: Game Ready PRN  Precautions: standard  Exercises:  Exercise Reps/ Time Weight/ Level   Comments    Lateral elliptical   4'    x  Switch directions every minute   Sidelying           Clams 90/30 deg 20     Ankles on ball-Add resistance next    Verónica hip abd 3x6 4 lbs  W/ board               Gym           Post tib calf raises 2x15   xx     3 Way Hip  2x10 blue xx R in CKC; issued blue   Monster walks 4x20' blue xx Issued BLUE band   Step downs 2x10  6\" xx Posterior and lateral-watch for valgus   Heel taps 2x15 4\" xx     MOBO           -calf stretch 3x30\" 1/ xx     -functional reach 2x10 2/4 xx     -RDLs 15x 2/4 xx     -Rocks 20x ea /, 1/3 xx     R legged calf raises 3x10 /4 xx     Split squats  2x10      Other:                   Left Right RIght         Hip Ext 54.5 57.8 47.4         ER 41.1 34.4 48.7         Prone ER 19.0 17.7 20.2         ABD 29.2 30.1 28.5         Strength measures are in lbs and measured with a handheld

## 2025-06-04 ENCOUNTER — HOSPITAL ENCOUNTER (OUTPATIENT)
Dept: PHYSICAL THERAPY | Facility: CLINIC | Age: 55
Setting detail: THERAPIES SERIES
Discharge: HOME | End: 2025-06-04
Payer: COMMERCIAL

## 2025-06-04 PROCEDURE — 97110 THERAPEUTIC EXERCISES: CPT

## 2025-06-04 NOTE — FLOWSHEET NOTE
[x] [facility]  Outpatient Rehabilitation &  Therapy  [address]  P: [phone]  F: [phone]     Physical Therapy Daily Treatment Note    Date:  2025  Patient Name:  Susan Li    :  1970  MRN: [id number]  Referring provider: Dr. Vinicius Sandhu                                     Insurance:  BCBS visits 110/110 rem, no auh req, ded 750/met, oop 3000/2108.86 rem   Medical Diagnosis: R post tib tendon rupture          Rehab Codes: M79.671  Onset date:    10/2/24                                               Next 's appt: none                           Visit# / total visits:                                             Cancels/No Shows: 1/0     Subjective:    Pain:  [] Yes  [x] No   Location: R post tib     Pain Rating: (0-10 scale) 0/10  Pain altered Tx:  [x] No  [] Yes  Action:  Comments: Pt reported no pain in the right ankle prior to the start of therapy and denied any adverse responses to last session, compliant with HEP.           Objective:  Modalities: Game Ready PRN  Precautions: standard  Exercises:  Exercise Reps/ Time Weight/ Level   Comments    Lateral elliptical   4'     Switch directions every minute   Sidelying          Clams 90/30 deg 20    Ankles on ball-Add resistance next    Verónica hip abd 3x6 4 lbs  W/ board              Gym          Post tib calf raises 2x15        3 Way Hip  2x10 blue  R in CKC; issued blue   Monster walks 4x20' blue  Issued BLUE band   Step downs 2x10  6\"  Posterior and lateral-watch for valgus   Heel taps 2x15 4\"      MOBO          -calf stretch 3x30\" 1/2 x     -functional reach 2x10 2/4 x     -RDLs 15x 2/4 x     -Rocks 20x ea 2/4, 1/3 x     R legged calf raises 3x10 1/4      Split squats  2x10         Other: Alter G running at 70% BW, (4 minute walk, 1 minute run)x6 run at 6.5mph and walk at 3.0 mph                   Left Right RIght         Hip Ext 54.5 57.8 47.4         ER 41.1 34.4 48.7         Prone

## 2025-06-06 ENCOUNTER — HOSPITAL ENCOUNTER (OUTPATIENT)
Dept: PHYSICAL THERAPY | Facility: CLINIC | Age: 55
Setting detail: THERAPIES SERIES
Discharge: HOME | End: 2025-06-06
Payer: COMMERCIAL

## 2025-06-06 PROCEDURE — 97110 THERAPEUTIC EXERCISES: CPT

## 2025-06-06 NOTE — FLOWSHEET NOTE
[x] [facility]  Outpatient Rehabilitation &  Therapy  [address]  P: [phone]  F: [phone]     Physical Therapy Daily Treatment Note    Date:  2025  Patient Name:  Susan Li    :  1970  MRN: [id number]  Referring provider: Dr. Vinicius Sandhu                                     Insurance:  BCBS visits 110/110 rem, no auh req, ded 750/met, oop 3000/2108.86 rem   Medical Diagnosis: R post tib tendon rupture          Rehab Codes: M79.671  Onset date:    10/2/24                                               Next 's appt: none                           Visit# / total visits:                                             Cancels/No Shows: 1/0     Subjective:    Pain:  [] Yes  [x] No   Location: R post tib     Pain Rating: (0-10 scale) 0/10  Pain altered Tx:  [x] No  [] Yes  Action:  Comments: Pt reported no pain in the right ankle prior to the start of therapy and denied any adverse responses to last session, compliant with HEP. Stated she felt no increase in soreness following running on the [employer].          Objective:  Modalities: Game Ready PRN  Precautions: standard  Exercises:  Exercise Reps/ Time Weight/ Level   Comments    Lateral elliptical   4'      Switch directions every minute   Sidelying           Clams 90/30 deg 20     Ankles on ball-Add resistance next    Verónica hip abd 3x6 4 lbs   W/ board               Gym           Post tib calf raises 2x15         3 Way Hip  2x10 blue   R in CKC; issued blue   Monster walks 4x20' blue   Issued BLUE band   Step downs 2x10  6\"   Posterior and lateral-watch for valgus   Heel taps 2x15 4\"       MOBO           -calf stretch 3x30\" 1/2 x     -functional reach 2x10 2/4 x     -RDLs 15x 2/4 x     -Rocks 20x ea 2/4, 1/3 x     R legged calf raises 3x10 1/4       Split squats  2x10         Other: Alter G at 4' walk/1' run x 6 at 75% and increase 5% every cycle.    -Walk: 3.0 MPH, Run is 6.5 MPH

## 2025-06-11 ENCOUNTER — HOSPITAL ENCOUNTER (OUTPATIENT)
Dept: PHYSICAL THERAPY | Facility: CLINIC | Age: 55
Setting detail: THERAPIES SERIES
Discharge: HOME | End: 2025-06-11
Payer: COMMERCIAL

## 2025-06-11 PROCEDURE — 97110 THERAPEUTIC EXERCISES: CPT

## 2025-06-11 NOTE — FLOWSHEET NOTE
[x] [facility]  Outpatient Rehabilitation &  Therapy  [address]  P: [phone]  F: [phone]     Physical Therapy Daily Treatment Note    Date:  2025  Patient Name:  Susan Li    :  1970  MRN: [id number]  Referring provider: Dr. Vinicius Sandhu                                     Insurance:  BCBS visits 110/110 rem, no auh req, ded 750/met, oop 3000/2108.86 rem   Medical Diagnosis: R post tib tendon rupture          Rehab Codes: M79.671  Onset date:    10/2/24                                               Next 's appt: none                           Visit# / total visits:                                             Cancels/No Shows: 1/0     Subjective:    Pain:  [] Yes  [x] No   Location: R post tib     Pain Rating: (0-10 scale) 0/10  Pain altered Tx:  [x] No  [] Yes  Action:  Comments: no issues with R post tib.  Ready to return to run on TM at 100%.       Objective:  Modalities: Game Ready PRN  Precautions: standard  Exercises:  Exercise Reps/ Time Weight/ Level   Comments    Lateral elliptical   4'      Switch directions every minute   Sidelying           Clams 90/30 deg 20     Ankles on ball-Add resistance next    Verónica hip abd 3x6 4 lbs   W/ board               Gym           Post tib calf raises 2x15         3 Way Hip  2x10 blue   R in CKC; issued blue   Monster walks 4x20' blue   Issued BLUE band   Step downs 2x10  6\"   Posterior and lateral-watch for valgus   BAPS 2x10 L2 X    Heel taps 2x15 4\"       MOBO           -calf stretch 3x30\" 1/2      -functional reach 2x10 2/4      -RDLs 15x 2/4      -Rocks 20x ea 2/4, 1/3      R legged calf raises 3x10 1/4       Split squats  2x10         TM walk/run 4'/1' x 6 3.0/6.0 X           5/5 5/5 5/19            Left Right RIght         Hip Ext 54.5 57.8 47.4         ER 41.1 34.4 48.7         Prone ER 19.0 17.7 20.2         ABD 29.2 30.1 28.5         Strength measures are in lbs and measured with a handheld

## 2025-07-02 ENCOUNTER — APPOINTMENT (OUTPATIENT)
Dept: PHYSICAL THERAPY | Facility: CLINIC | Age: 55
End: 2025-07-02
Payer: COMMERCIAL

## 2025-07-09 ENCOUNTER — HOSPITAL ENCOUNTER (OUTPATIENT)
Dept: PHYSICAL THERAPY | Facility: CLINIC | Age: 55
Setting detail: THERAPIES SERIES
Discharge: HOME | End: 2025-07-09
Payer: COMMERCIAL

## 2025-07-09 PROCEDURE — 97016 VASOPNEUMATIC DEVICE THERAPY: CPT

## 2025-07-09 PROCEDURE — 97140 MANUAL THERAPY 1/> REGIONS: CPT

## 2025-07-09 NOTE — FLOWSHEET NOTE
[x] [facility]  Outpatient Rehabilitation &  Therapy  [address]  P: [phone]  F: [phone]     Physical Therapy Daily Treatment Note    Date:  2025  Patient Name:  Susan Li    :  1970  MRN: [id number]  Referring provider: Dr. Vinicius Sandhu                                     Insurance:  BCBS visits 110/110 rem, no auh req, ded 750/met, oop 3000/2108.86 rem   Medical Diagnosis: R post tib tendon rupture          Rehab Codes: M79.671  Onset date:    10/2/24                                               Next 's appt: none                           Visit# / total visits: 10/12                                            Cancels/No Shows: 1/0     Subjective:    Pain:  [] Yes  [x] No   Location: R post tib     Pain Rating: (0-10 scale) 0/10  Pain altered Tx:  [x] No  [] Yes  Action:  Comments: has developed dorsal LATERAL mid foot pain on .  Has not run this week due to pain.  The pain developed the morning after doing a lot of lunges. No trauma.   Her last run was 3 days 2 cycles of 2' walk/3' run and had ot stop due to LATERAL foot pain.   That was her 2nd run at 2' walk/3' run.  Walk/run transition is stiff in BILATERAL feet until she gets warmed up.         Objective:  Modalities: Game Ready PRN  Precautions: standard  Exercises:  Exercise Reps/ Time Weight/ Level   Comments    Lateral elliptical   4'      Switch directions every minute   Sidelying           Clams 90/30 deg 20     Ankles on ball-Add resistance next    Verónica hip abd 3x6 4 lbs   W/ board               Gym           Post tib calf raises 2x15         3 Way Hip  2x10 blue   R in CKC; issued blue   Monster walks 4x20' blue   Issued BLUE band   Step downs 2x10  6\"   Posterior and lateral-watch for valgus   BAPS 2x10 L2 X    Heel taps 2x15 4\"       MOBO           -calf stretch 3x30\" 1/2      -functional reach 2x10 2/4      -RDLs 15x 2/4      -Rocks 20x ea 2/4, 1/3      R legged calf raises

## 2025-07-14 ENCOUNTER — HOSPITAL ENCOUNTER (OUTPATIENT)
Dept: PHYSICAL THERAPY | Facility: CLINIC | Age: 55
Setting detail: THERAPIES SERIES
Discharge: HOME | End: 2025-07-14
Payer: COMMERCIAL

## 2025-07-14 PROCEDURE — 97110 THERAPEUTIC EXERCISES: CPT

## 2025-07-14 NOTE — FLOWSHEET NOTE
[x] [facility]  Outpatient Rehabilitation &  Therapy  [address]  P: [phone]  F: [phone]     Physical Therapy Daily Treatment Note    Date:  2025  Patient Name:  Susan Li    :  1970  MRN: [id number]  Referring provider: Dr. Vinicius Sandhu                                     Insurance:  [employer] visits 110/110 rem, no auh req, ded 750/met, oop 3000/2108.86 rem   Medical Diagnosis: R post tib tendon rupture          Rehab Codes: M79.671  Onset date:    10/2/24                                               Next 's appt: none                           Visit# / total visits:                                             Cancels/No Shows: 1/0     Subjective:    Pain:  [] Yes  [x] No   Location: R post tib     Pain Rating: (0-10 scale) 0/10  Pain altered Tx:  [x] No  [] Yes  Action:  Comments: is feeling better than last week.  Ran   Has not run this week due to pain.  Walk/run transition is better in BILATERAL feet until she gets warmed up.         Objective:  Modalities: Game Ready PRN  Precautions: standard  Exercises:  Exercise Reps/ Time Weight/ Level   Comments    Lateral elliptical   4'      Switch directions every minute   Sidelying           Clams 90/30 deg 20    -- Ankles on ball-Add resistance next    [place] hip abd 3x6 4 lbs  -- W/ board               Gym           Post tib calf raises 2x15         [address] Hip  2x10 blue   R in CKC; issued blue   Monster walks 4x20' blue   Issued BLUE band   Step downs 15  8\" X Posterior and lateral-watch for valgus   BAPS 2x10 L2/3 X    Heel taps 2x15 4\"       MOBO           -calf stretch 3x30\" 1/2      -functional reach 2x10 2/4      -RDLs 15x 2/4      -Rocks 20x ea 2/4, 1/3      R legged calf raises 3x10 1/4       RDL to OH press 2x10 5 lb KB X    Step backs 15x 12 lb bar x bilateral   Lunge walks 3x15 12 lb bar x + 1x5 lb KB   Split squats  2x10  BOSU X     TM walk/run 4'/1' x 6 3.0/6.0 --

## 2025-07-21 ENCOUNTER — APPOINTMENT (OUTPATIENT)
Dept: PHYSICAL THERAPY | Facility: CLINIC | Age: 55
End: 2025-07-21
Payer: COMMERCIAL

## 2025-08-11 ENCOUNTER — HOSPITAL ENCOUNTER (OUTPATIENT)
Dept: PHYSICAL THERAPY | Facility: CLINIC | Age: 55
Setting detail: THERAPIES SERIES
Discharge: HOME | End: 2025-08-11
Payer: COMMERCIAL

## 2025-08-11 PROCEDURE — 97110 THERAPEUTIC EXERCISES: CPT

## 2025-08-11 PROCEDURE — 97530 THERAPEUTIC ACTIVITIES: CPT

## (undated) DEVICE — PADDING UNDERCAST W3INXL4YD PURE COT FBR LO LINTING WYTEX

## (undated) DEVICE — Device

## (undated) DEVICE — STRAP,POSITIONING,KNEE/BODY,FOAM,4X60": Brand: MEDLINE

## (undated) DEVICE — BANDAGE COMPR W3INXL5YD WHT BGE POLY COT M E WRP WV HK AND

## (undated) DEVICE — BANDAGE COMPR W4INXL5YD WHT BGE POLY COT M E WRP WV HK AND